# Patient Record
Sex: MALE | URBAN - METROPOLITAN AREA
[De-identification: names, ages, dates, MRNs, and addresses within clinical notes are randomized per-mention and may not be internally consistent; named-entity substitution may affect disease eponyms.]

---

## 2023-12-06 ENCOUNTER — APPOINTMENT (OUTPATIENT)
Dept: PRIMARY CARE | Facility: CLINIC | Age: 28
End: 2023-12-06
Payer: COMMERCIAL

## 2023-12-06 PROBLEM — F90.2 ATTENTION DEFICIT HYPERACTIVITY DISORDER (ADHD), COMBINED TYPE: Status: ACTIVE | Noted: 2023-12-06

## 2023-12-06 PROBLEM — L21.0 DANDRUFF: Status: ACTIVE | Noted: 2023-12-06

## 2023-12-06 PROBLEM — M21.40 FLAT FOOT: Status: ACTIVE | Noted: 2023-12-06

## 2023-12-06 PROBLEM — G47.33 OSA ON CPAP: Status: ACTIVE | Noted: 2023-12-06

## 2023-12-26 ENCOUNTER — OFFICE VISIT (OUTPATIENT)
Dept: PRIMARY CARE | Facility: CLINIC | Age: 28
End: 2023-12-26
Payer: COMMERCIAL

## 2023-12-26 VITALS
TEMPERATURE: 97 F | SYSTOLIC BLOOD PRESSURE: 117 MMHG | BODY MASS INDEX: 32.14 KG/M2 | WEIGHT: 200 LBS | RESPIRATION RATE: 18 BRPM | DIASTOLIC BLOOD PRESSURE: 76 MMHG | HEIGHT: 66 IN | HEART RATE: 87 BPM | OXYGEN SATURATION: 97 %

## 2023-12-26 DIAGNOSIS — G47.33 OSA ON CPAP: ICD-10-CM

## 2023-12-26 DIAGNOSIS — Z00.00 ROUTINE HEALTH MAINTENANCE: Primary | ICD-10-CM

## 2023-12-26 DIAGNOSIS — M25.562 LEFT KNEE PAIN, UNSPECIFIED CHRONICITY: ICD-10-CM

## 2023-12-26 DIAGNOSIS — R09.81 NASAL CONGESTION: ICD-10-CM

## 2023-12-26 PROBLEM — F41.1 GAD (GENERALIZED ANXIETY DISORDER): Status: ACTIVE | Noted: 2023-12-26

## 2023-12-26 PROBLEM — K21.9 GASTROESOPHAGEAL REFLUX DISEASE WITHOUT ESOPHAGITIS: Status: ACTIVE | Noted: 2022-01-31

## 2023-12-26 PROBLEM — S32.040A COMPRESSION FRACTURE OF FOURTH LUMBAR VERTEBRA (MULTI): Status: ACTIVE | Noted: 2023-05-11

## 2023-12-26 PROBLEM — R06.83 SNORING: Status: ACTIVE | Noted: 2020-03-12

## 2023-12-26 PROBLEM — R00.2 PALPITATION: Status: ACTIVE | Noted: 2020-03-12

## 2023-12-26 PROBLEM — R09.89 CHRONIC THROAT CLEARING: Status: ACTIVE | Noted: 2021-04-02

## 2023-12-26 PROBLEM — G47.19 EXCESSIVE DAYTIME SLEEPINESS: Status: ACTIVE | Noted: 2020-03-12

## 2023-12-26 PROBLEM — R21 RASH: Status: ACTIVE | Noted: 2020-05-11

## 2023-12-26 PROCEDURE — 99213 OFFICE O/P EST LOW 20 MIN: CPT | Performed by: FAMILY MEDICINE

## 2023-12-26 PROCEDURE — 90686 IIV4 VACC NO PRSV 0.5 ML IM: CPT | Performed by: FAMILY MEDICINE

## 2023-12-26 PROCEDURE — 1036F TOBACCO NON-USER: CPT | Performed by: FAMILY MEDICINE

## 2023-12-26 PROCEDURE — 99395 PREV VISIT EST AGE 18-39: CPT | Performed by: FAMILY MEDICINE

## 2023-12-26 PROCEDURE — 90471 IMMUNIZATION ADMIN: CPT | Performed by: FAMILY MEDICINE

## 2023-12-26 ASSESSMENT — ENCOUNTER SYMPTOMS
SORE THROAT: 1
COUGH: 0
FEVER: 0
ABDOMINAL PAIN: 1
ARTHRALGIAS: 1

## 2023-12-26 NOTE — PROGRESS NOTES
"Subjective   Patient ID: Shaun Monterroso is a 28 y.o. male who presents for Annual Exam (/) and Knee Pain.    Knee Pain   The incident occurred more than 1 week ago. The pain is present in the left knee. The quality of the pain is described as aching. The pain is at a severity of 5/10. The pain is moderate. The symptoms are aggravated by movement.     He is here today for annual physical.  Would also like to discuss several other issues  Knee pain: He has had pain in his left knee for approximately 1.5 months.  There was no injury prior to onset, however he does play volleyball and is not sure if this may have contributed to his knee pain.  No history of any knee problems in the past.  Pain is located anterior aspect of knee and is most noticeable when he is squatting.  Pain can be up to a 4-5 out of 10 intensity pain at its worst.  Nothing to treat.  Knee does not feel unstable  He has a history of mild obstructive sleep apnea and follows with a sleep specialist.  He has had difficulty tolerating CPAP in the past.  He most recently saw sleep medicine in February and they had recommended that he see ENT.  He has had left nostril congestion for the past 6 to 7 months.  Using Flonase  He does feel tired and sleepy during the day.  Admits to gasping at night  He does not currently smoke but he does vape  Last Tdap vaccine was 2020  Developed a scratchy throat this morning.  No fever.      Review of Systems   Constitutional:  Negative for fever.   HENT:  Positive for congestion and sore throat. Negative for ear pain.    Respiratory:  Negative for cough.    Cardiovascular:  Negative for chest pain.   Gastrointestinal:  Positive for abdominal pain.   Musculoskeletal:  Positive for arthralgias.   All other systems reviewed and are negative.      Objective   /76   Pulse 87   Temp 36.1 °C (97 °F)   Resp 18   Ht 1.676 m (5' 6\")   Wt 90.7 kg (200 lb)   SpO2 97%   BMI 32.28 kg/m²     Physical Exam  Vitals reviewed. "   Constitutional:       General: He is not in acute distress.     Appearance: Normal appearance. He is well-developed.   HENT:      Head: Normocephalic.      Right Ear: Tympanic membrane, ear canal and external ear normal.      Left Ear: Tympanic membrane, ear canal and external ear normal.      Nose: Congestion present.      Mouth/Throat:      Mouth: Mucous membranes are moist.   Eyes:      Conjunctiva/sclera: Conjunctivae normal.   Neck:      Thyroid: No thyromegaly.      Vascular: No JVD.   Cardiovascular:      Rate and Rhythm: Normal rate and regular rhythm.      Heart sounds: Normal heart sounds.   Pulmonary:      Effort: Pulmonary effort is normal.      Breath sounds: Normal breath sounds.   Abdominal:      Palpations: Abdomen is soft.      Tenderness: There is no abdominal tenderness.   Musculoskeletal:         General: Normal range of motion.      Comments: Left knee exam: There is no tenderness to palpation.  Knee strength is plus 5 out of 5.  No swelling, erythema or effusion.  No significant pain with Nely's   Lymphadenopathy:      Cervical: No cervical adenopathy.   Skin:     Findings: No rash.   Neurological:      Mental Status: He is alert and oriented to person, place, and time.   Psychiatric:         Mood and Affect: Mood normal.         Behavior: Behavior normal.         Assessment/Plan   Problem List Items Addressed This Visit             ICD-10-CM       Medium    DELFINA on CPAP G47.33     Other Visit Diagnoses         Codes    Routine health maintenance    -  Primary Z00.00    Relevant Orders    CBC    Comprehensive Metabolic Panel    Lipid Panel    TSH with reflex to Free T4 if abnormal    Ferritin    Nasal congestion     R09.81    Relevant Orders    Referral to ENT    Left knee pain, unspecified chronicity     M25.562        #1 preventative health  Healthy diet and regular exercise is recommended  Check screening labs including lipid panel and glucose.  His sleep medicine physician had ordered  a ferritin level for RLS symptoms so we will add this to his labs as well  Follow-up in 1 year for next CPE  Given flu vaccine today.  Up-to-date on tetanus vaccination  2.  Left knee pain: He has had a 1.5-month history of intermittent left knee pain.  Knee exam today is unremarkable.  Recommend rest and avoiding any aggravating activities.  If this has not resolved in the next 3 to 4 weeks, recommended that he contact us and we will discuss x-ray and PT referral at that time  3.  Chronic left nasal congestion.  Continue Flonase and we will refer him to establish with ENT  Continue to follow with sleep medicine for mild DELFINA

## 2024-01-11 ENCOUNTER — LAB (OUTPATIENT)
Dept: LAB | Facility: LAB | Age: 29
End: 2024-01-11
Payer: COMMERCIAL

## 2024-01-11 DIAGNOSIS — Z00.00 ROUTINE HEALTH MAINTENANCE: ICD-10-CM

## 2024-01-11 LAB
ALBUMIN SERPL BCP-MCNC: 4.8 G/DL (ref 3.4–5)
ALP SERPL-CCNC: 83 U/L (ref 33–120)
ALT SERPL W P-5'-P-CCNC: 54 U/L (ref 10–52)
ANION GAP SERPL CALC-SCNC: 13 MMOL/L (ref 10–20)
AST SERPL W P-5'-P-CCNC: 35 U/L (ref 9–39)
BILIRUB SERPL-MCNC: 0.7 MG/DL (ref 0–1.2)
BUN SERPL-MCNC: 12 MG/DL (ref 6–23)
CALCIUM SERPL-MCNC: 9.8 MG/DL (ref 8.6–10.3)
CHLORIDE SERPL-SCNC: 105 MMOL/L (ref 98–107)
CHOLEST SERPL-MCNC: 206 MG/DL (ref 0–199)
CHOLESTEROL/HDL RATIO: 3.9
CO2 SERPL-SCNC: 28 MMOL/L (ref 21–32)
CREAT SERPL-MCNC: 0.98 MG/DL (ref 0.5–1.3)
EGFRCR SERPLBLD CKD-EPI 2021: >90 ML/MIN/1.73M*2
ERYTHROCYTE [DISTWIDTH] IN BLOOD BY AUTOMATED COUNT: 12.3 % (ref 11.5–14.5)
FERRITIN SERPL-MCNC: 301 NG/ML (ref 20–300)
GLUCOSE SERPL-MCNC: 86 MG/DL (ref 74–99)
HCT VFR BLD AUTO: 50.7 % (ref 41–52)
HDLC SERPL-MCNC: 53 MG/DL
HGB BLD-MCNC: 16.9 G/DL (ref 13.5–17.5)
LDLC SERPL CALC-MCNC: 138 MG/DL
MCH RBC QN AUTO: 28.8 PG (ref 26–34)
MCHC RBC AUTO-ENTMCNC: 33.3 G/DL (ref 32–36)
MCV RBC AUTO: 87 FL (ref 80–100)
NON HDL CHOLESTEROL: 153 MG/DL (ref 0–149)
NRBC BLD-RTO: 0 /100 WBCS (ref 0–0)
PLATELET # BLD AUTO: 262 X10*3/UL (ref 150–450)
POTASSIUM SERPL-SCNC: 4.5 MMOL/L (ref 3.5–5.3)
PROT SERPL-MCNC: 7.4 G/DL (ref 6.4–8.2)
RBC # BLD AUTO: 5.86 X10*6/UL (ref 4.5–5.9)
SODIUM SERPL-SCNC: 141 MMOL/L (ref 136–145)
TRIGL SERPL-MCNC: 77 MG/DL (ref 0–149)
TSH SERPL-ACNC: 1.25 MIU/L (ref 0.44–3.98)
VLDL: 15 MG/DL (ref 0–40)
WBC # BLD AUTO: 5.9 X10*3/UL (ref 4.4–11.3)

## 2024-01-11 PROCEDURE — 85027 COMPLETE CBC AUTOMATED: CPT

## 2024-01-11 PROCEDURE — 36415 COLL VENOUS BLD VENIPUNCTURE: CPT

## 2024-01-11 PROCEDURE — 80061 LIPID PANEL: CPT

## 2024-01-11 PROCEDURE — 80053 COMPREHEN METABOLIC PANEL: CPT

## 2024-01-11 PROCEDURE — 82728 ASSAY OF FERRITIN: CPT

## 2024-01-11 PROCEDURE — 84443 ASSAY THYROID STIM HORMONE: CPT

## 2024-01-12 ENCOUNTER — TELEPHONE (OUTPATIENT)
Dept: PRIMARY CARE | Facility: CLINIC | Age: 29
End: 2024-01-12
Payer: COMMERCIAL

## 2024-01-12 DIAGNOSIS — R74.01 ELEVATED ALT MEASUREMENT: ICD-10-CM

## 2024-01-12 DIAGNOSIS — R79.89 ELEVATED FERRITIN: Primary | ICD-10-CM

## 2024-01-12 NOTE — TELEPHONE ENCOUNTER
I discussed lab results with him over the phone  Lipid panel is suboptimal with total cholesterol 206 and .  Briefly discussed healthy diet and regular exercise and we will check this again in 1 year  His ferritin is slightly elevated at 301, and ALT slightly elevated 54.  No excess alcohol use and no history of any liver problems in the past.  Since these are only slightly elevated, I would like to recheck these tests along with a full iron panel in 2 to 3 months to confirm that they return to normal range

## 2024-02-13 ENCOUNTER — OFFICE VISIT (OUTPATIENT)
Dept: OTOLARYNGOLOGY | Facility: CLINIC | Age: 29
End: 2024-02-13
Payer: COMMERCIAL

## 2024-02-13 VITALS
BODY MASS INDEX: 33.17 KG/M2 | HEIGHT: 66 IN | WEIGHT: 206.4 LBS | TEMPERATURE: 97.8 F | DIASTOLIC BLOOD PRESSURE: 75 MMHG | SYSTOLIC BLOOD PRESSURE: 117 MMHG

## 2024-02-13 DIAGNOSIS — J30.9 CHRONIC ALLERGIC RHINITIS: Primary | ICD-10-CM

## 2024-02-13 DIAGNOSIS — Z86.69 HISTORY OF SLEEP APNEA: ICD-10-CM

## 2024-02-13 DIAGNOSIS — J34.3 HYPERTROPHY OF BOTH INFERIOR NASAL TURBINATES: ICD-10-CM

## 2024-02-13 PROCEDURE — 31575 DIAGNOSTIC LARYNGOSCOPY: CPT | Performed by: OTOLARYNGOLOGY

## 2024-02-13 PROCEDURE — 1036F TOBACCO NON-USER: CPT | Performed by: OTOLARYNGOLOGY

## 2024-02-13 PROCEDURE — 99203 OFFICE O/P NEW LOW 30 MIN: CPT | Performed by: OTOLARYNGOLOGY

## 2024-02-13 RX ORDER — METHYLPHENIDATE HYDROCHLORIDE 18 MG/1
18 TABLET ORAL EVERY MORNING
COMMUNITY
End: 2024-06-10 | Stop reason: ALTCHOICE

## 2024-02-13 ASSESSMENT — PATIENT HEALTH QUESTIONNAIRE - PHQ9
2. FEELING DOWN, DEPRESSED OR HOPELESS: NOT AT ALL
SUM OF ALL RESPONSES TO PHQ9 QUESTIONS 1 AND 2: 0
1. LITTLE INTEREST OR PLEASURE IN DOING THINGS: NOT AT ALL

## 2024-02-13 NOTE — PROGRESS NOTES
Impression:  1. Chronic allergic rhinitis        2. Hypertrophy of both inferior nasal turbinates  Referral to ENT      3. History of sleep apnea              RECOMMENDATIONS/PLAN :  I reassured the patient that his septum is relatively straight and I do not recommend any surgical intervention at the present time.  I do want him to rinse his nose using saline rinses and use his Flonase nasal spray-2 puffs each nostril on a daily basis.  He will touch base with his sleep doctor regarding trying a new CPAP mask.      **This electronic medical record note was created with the use of voice recognition software.  Despite proofreading, typographical or grammatical errors may be present that could affect meaning of content **    Subjective   Patient ID:     Shaun Monterroso is a 28 y.o. male who presents to the office today complaining of nasal congestion that seems to be a little worse in the left nostril.  He has not been using Flonase on a regular basis.  Currently he is not rinsing his nose using saline rinses.  He denies repetitive sinus infections.  Apparently he has had a previous sleep study that revealed mild obstructive sleep apnea and he has tried various masks without much success.    ROS:  A detailed 12 system review of systems is noted on the intake form has been reviewed with the patient with details noted in the HPI and scanned into the patient's medical record.    Objective     History reviewed. No pertinent past medical history.     Past Surgical History:   Procedure Laterality Date    OTHER SURGICAL HISTORY  10/10/2022    No history of surgery        No Known Allergies       Current Outpatient Medications:     methylphenidate ER (Concerta) 18 mg daily tablet, Take 1 tablet (18 mg) by mouth once daily in the morning. Do not crush, chew, or split., Disp: , Rfl:      Tobacco Use: Low Risk  (2/13/2024)    Patient History     Smoking Tobacco Use: Never     Smokeless Tobacco Use: Never     Passive Exposure: Not  "on file        Alcohol Use: Not on file        Social History     Substance and Sexual Activity   Drug Use Never        Physical Exam:  Visit Vitals  /75   Temp 36.6 °C (97.8 °F) (Temporal)   Ht 1.676 m (5' 6\")   Wt 93.6 kg (206 lb 6.4 oz)   BMI 33.31 kg/m²   Smoking Status Never   BSA 2.09 m²      General: Patient is alert, oriented, cooperative in no apparent distress.  Head: Normocephalic, atraumatic.  Eyes: PERRL, EOMI, Conjunctiva is clear. No nystagmus.  Ears: Right Ear-- Pinna is normal.  External auditory canal is patent. Tympanic membrane is [intact, translucent and has good mobility with my pneumatic otoscope. No effusion].  Mastoid is nontender.  Left ear-- Pinna is normal.  External auditory canal is patent. Tympanic membrane is [intact, translucent and has good mobility with my pneumatic otoscope.  No effusion].  Mastoid is nontender.  Nose: Septum is relatively straight with a mild spur to the right.  No septal perforation or lesions. No septal hematoma/ seroma.  No signs of bleeding.  Inferior turbinates are moderately swollen.   No evidence of intranasal polyps.  No infectious drainage.  Throat:  Floor of mouth is clear, no masses.  Tongue appears normal, no lesions or masses. Gums, gingiva, buccal mucosa appear pink and moist, no lesions. Teeth are in good repair.  No obvious dental infections.  Peritonsillar regions appear symmetric without swelling.  Hard and soft palate appear normal, no obvious cleft. Uvula is midline.  Oropharynx: No lesions. Retropharyngeal wall is flat.  No active postnasal drip.  Neck: Supple,  no lymphadenopathy.  No masses.  Salivary Glands: Symmetric bilaterally.  No palpable masses.  No evidence of acute infection or salivary stones  Neurologic: Cranial Nerves 2-12 are grossly intact without focal deficits. Cerebellar function testing is normal.     Results:   []    Procedure:   Pre Op Diagnosis: Chronic nasal congestion-rule out intranasal polyps  Post Op " Diagnosis: Same  Procedure: Flexible Nasopharyngolaryngoscopy  Surgeon: Juan Holt DO  Assist: None  Anesthesia: Topical Lidocaine 4%/ 0.05% Afrin 1:1 mixture  EBL: None  Complications: None  Disposition: The patient tolerated the procedure well. There were no complications.    Procedure:  After informed consent was obtained with the risks, benefits, complications and alternatives explained to the patient/ guardian, the patient was sat up in the ENT chair and the nose was anesthetized and decongested with topical  4% lidocaine and 0.05% Afrin. After allowing this to take effect, the flexible nasopharyngoscope was advanced thru the nostrils and down to the larynx. The following areas were visualized:  The nasal passages, septum, nasopharynx, sinus ostia, base of tongue, epiglottis, true and false vocal folds, arytenoids, post cricoid region and subglottis were all examined and found to be normal except as follows:  Septum is relatively straight with a mild spur to the right.  Ostiomeatal complexes are clear bilaterally.  No pus polyps or lesions.  Nasopharynx is clear.  No masses.  Base of tongue clear.  Epiglottis is normal.  True and false vocal cords are approximating equally bilaterally.  No nodules polyps or lesions.  Subglottis is clear.      The patient tolerated the procedure well and there were no complications.      Juan Holt DO

## 2024-06-10 ENCOUNTER — OFFICE VISIT (OUTPATIENT)
Dept: SLEEP MEDICINE | Facility: CLINIC | Age: 29
End: 2024-06-10
Payer: COMMERCIAL

## 2024-06-10 VITALS
BODY MASS INDEX: 32.67 KG/M2 | SYSTOLIC BLOOD PRESSURE: 123 MMHG | DIASTOLIC BLOOD PRESSURE: 81 MMHG | HEART RATE: 86 BPM | WEIGHT: 203.3 LBS | OXYGEN SATURATION: 98 % | HEIGHT: 66 IN

## 2024-06-10 DIAGNOSIS — G47.33 OSA (OBSTRUCTIVE SLEEP APNEA): Primary | ICD-10-CM

## 2024-06-10 DIAGNOSIS — G47.19 EXCESSIVE DAYTIME SLEEPINESS: ICD-10-CM

## 2024-06-10 DIAGNOSIS — G47.33 OSA ON CPAP: ICD-10-CM

## 2024-06-10 PROCEDURE — 99214 OFFICE O/P EST MOD 30 MIN: CPT | Performed by: INTERNAL MEDICINE

## 2024-06-10 RX ORDER — FLUTICASONE PROPIONATE 50 MCG
1 SPRAY, SUSPENSION (ML) NASAL DAILY
COMMUNITY
Start: 2024-02-27

## 2024-06-10 RX ORDER — GABAPENTIN 300 MG/1
CAPSULE ORAL
Qty: 60 CAPSULE | Refills: 2 | Status: SHIPPED | OUTPATIENT
Start: 2024-06-10

## 2024-06-10 RX ORDER — CETIRIZINE HYDROCHLORIDE 10 MG/1
10 TABLET ORAL DAILY
COMMUNITY
Start: 2024-02-27

## 2024-06-10 ASSESSMENT — PATIENT HEALTH QUESTIONNAIRE - PHQ9
1. LITTLE INTEREST OR PLEASURE IN DOING THINGS: NOT AT ALL
2. FEELING DOWN, DEPRESSED OR HOPELESS: NOT AT ALL
SUM OF ALL RESPONSES TO PHQ9 QUESTIONS 1 AND 2: 0

## 2024-06-10 ASSESSMENT — PAIN SCALES - GENERAL: PAINLEVEL: 0-NO PAIN

## 2024-06-10 ASSESSMENT — COLUMBIA-SUICIDE SEVERITY RATING SCALE - C-SSRS
6. HAVE YOU EVER DONE ANYTHING, STARTED TO DO ANYTHING, OR PREPARED TO DO ANYTHING TO END YOUR LIFE?: NO
1. IN THE PAST MONTH, HAVE YOU WISHED YOU WERE DEAD OR WISHED YOU COULD GO TO SLEEP AND NOT WAKE UP?: NO
2. HAVE YOU ACTUALLY HAD ANY THOUGHTS OF KILLING YOURSELF?: NO

## 2024-06-10 NOTE — PROGRESS NOTES
Patient: Shaun Monterroso   Patient info: [id number]  : 1995 -- AGE 28 y.o.    Clinician: Yamilex Bruno MD   Location [facility]   Service Date: 6/10/2024          [facility]  Follow-up Visit Note         HISTORY OF PRESENT ILLNESS     Shaun Monterroso is a 28 y.o. male who presents to a [facility] for followup.        PAST SLEEP HISTORY   Last seen: 2023  Summary of last visit: 27 year male here for the initial sleep evaluation of reportedly Mild DELFINA symptomatic with am headaches, unrefreshing sleep, loud bothersome snoring, and fatigue and sleepiness intermittently. PAP intolerant despite multiple mask trials and wakes up with mask off the face. Had unsuccessful attempt with PAP titration in the sleep lab, using nasal mask. Congestion is problematic and has done better but not fully better with FFM (still takes off mask at night). PAP intolerance may be multifactorial, and may be moving a lot in sleep (kicking behavior) which will effect ability for mask to stay on. Motivation is lower for PAP and seeks non PAP routes of care.      RLS: effects ability to cuddle with wife; but not sleep onset; Unclear time frame possibly few years. needs labs.      DATA:  HSAT ? needs these records---> requested today   2022 PAP titration study- required more air, but then could not tolerate PAP, AHI on PAP was 2.4  PLM 0. Study was ended early due to intolerance  Labs: CBC, liver function tests  done (showed my his mychart labs)--      Management  1. Please check to see if your machine is under the [employer] recall  - [employer] recall ma need to register their device   2. obtain your last records (home testing)  3. Lets try flonase for a good 1-2 weeks and then restart PAP therapy  4. Will also continue flonase until you see ENT: Dr. Mona Montano for non-PAP options  5. See Dr. Justin Velasquez for potential dental  appliance, after Dr. Montano's evaluation  Then we will see you back ? 6months or less.   6. Labs: for restless legs and memory- I will call you with results.   7. 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   8. agree with quitting vaping or at least reducing     RLS labs--> if iron low supplement; will add B12 due to memory concerns   ADHD meds--> PCP is prescribing.      Records request  1. Send for HSAT Results to   2. Send to [facility] with order to obtain PAP settings.      RTC: 6 months or sooner.        INTERIM DATA REVIEW:  Personally reviewed any raw data such as interpretation report, data sheet, hypnogram, and titration table if available and applicable  Notes and encounters in interim    CURRENT HISTORY/CONCERNS  Had mild DELFINA PAP and could not use  So we refer    SCALES:   ESS: 5 /24  DIOMEDES 16 /28  FOSQ 29 /40    On today's visit, the patient reports that he would like to revisit the discussion around sleep apnea.     DELFINA:   Not on PAP anymore  Had the machine that was under recall with [employer], new about the recall (?via [facility]) but never replaced it (and now its too late)    Daytime Symptoms  Pushes through the day, managing   But feels memory and energy levels are still a problem and would like to address it    Tried concerta, (had ADHD as a child) but did not help so now off.   ESS today is normal off meds     Sleep-Wake Schedule:   Night meds prior to sleep: none  Bedtime: 12am-1am awake at 9am  Tries to get up at 7am, but struggles for 2 hours then up by 9am  (Needs to be up but unable? Tries to adjust his schedule but was not able to stick to it)   Naps sometimes through him off so avoids these   Cannot cat nap (if he naps is 4 hours- rare)     Sleep paralysis/hallucinations- only once 2 weeks ago.   Denies cataplexy    REVIEW OF SYSTEMS   Review of Systems    ALLERGIES AND MEDICATIONS   No Known  "Allergies    MEDICATIONS:     Current Outpatient Medications:     cetirizine (ZyrTEC) 10 mg tablet, Take 1 tablet (10 mg) by mouth once daily., Disp: , Rfl:     fluticasone (Flonase) 50 mcg/actuation nasal spray, Administer 1 spray into each nostril once daily., Disp: , Rfl:       HISTORIES   PAST MEDICAL HISTORY : He  has no past medical history on file.  Patient Active Problem List   Diagnosis    Attention deficit hyperactivity disorder (ADHD), combined type    Dandruff    Flat foot    DELFINA on CPAP    Snoring    Rash    Palpitation    Gastroesophageal reflux disease without esophagitis    TRINIDAD (generalized anxiety disorder)    Excessive daytime sleepiness    Compression fracture of fourth lumbar vertebra (Multi)    Chronic throat clearing     PAST SURGICAL HISTORY: He  has a past surgical history that includes Other surgical history (10/10/2022).   FAMILY HISTORY: No changes since previous visit. Otherwise non-contributory as charted.     SOCIAL HISTORY  Patient:  reports that he has never smoked. He has never used smokeless tobacco. He reports that he does not drink alcohol and does not use drugs.     PHYSICAL EXAM     VITAL SIGNS: /81 (BP Location: Right arm, Patient Position: Sitting, BP Cuff Size: Large adult)   Pulse 86   Ht 1.676 m (5' 6\")   Wt 92.2 kg (203 lb 4.8 oz)   SpO2 98%   BMI 32.81 kg/m²   PREVIOUS WEIGHTS:  Wt Readings from Last 3 Encounters:   06/10/24 92.2 kg (203 lb 4.8 oz)   02/13/24 93.6 kg (206 lb 6.4 oz)   12/26/23 90.7 kg (200 lb)     EXAM:  General: Alert, attentive, in NAD  HEENT: Nares patent, oropharynx is Mallampati class 2-3 uvula: midline nonedematous,  large tongue for space   Lip assymetry  Dentition/Jaw: appropriate dentition;  no cross bite, upper teeth just over the lower with an overbite   Neck: no visible masses  Lungs: Clear no cough  Extremities: warm, well perfused, no visible edema, normal joints,   Skin: no visible rash   Neurologic: face symmetric   Psychiatric: " Affect appropriate      OTHER RESULTS/DATA     Lab Review  Last iron studies:   Ferritin (ng/mL)   Date Value   01/11/2024 301 (H)   :    CBC:   Lab Results   Component Value Date    WBC 5.9 01/11/2024    HGB 16.9 01/11/2024    HCT 50.7 01/11/2024    MCV 87 01/11/2024     01/11/2024    TSH:   Lab Results   Component Value Date    TSH 1.25 01/11/2024   ; BMP:   Lab Results   Component Value Date    GLUCOSE 86 01/11/2024    CALCIUM 9.8 01/11/2024     01/11/2024    K 4.5 01/11/2024    CO2 28 01/11/2024     01/11/2024    BUN 12 01/11/2024    CREATININE 0.98 01/11/2024         ASSESSMENT/PLAN   Mr. Monterroso is a 28 y.o. male  returning to the  Sleep Medicine Clinic for follow-up of DELFINA and sleep maintenance problems with next day concerns about mood/focus etc. Did not respond to trial with ADHD medication/stimulant with PCP. So wants to revisit sleep. Unclear what role DELFINA is playing in his presentation. There is a pain component of sleep and this may be influencing some of the sleep quality challenges he is experiencing. A trial of medication may be helpful to see if this consolidates sleep and helps with pain, and potentially improve daytime dysfunction. If not, will repeat testing and therapy for DELFINA.  We also talked about mood and he may benefit from therapy.     Problem List and Plan/Orders  Problem List Items Addressed This Visit       DELFINA on CPAP    Overview     DATA:  HSAT 2020? Requested those records; 11/2022 PAP titration study- required more air, but then could not tolerate PAP, AHI on PAP was 2.4 PLM 0. Study was ended early due to intolerance  Had [employer] recalled machine via [facility], new that but did not replace it.            Excessive daytime sleepiness     Other Visit Diagnoses       DELFINA (obstructive sleep apnea)    -  Primary    Relevant Medications    gabapentin (Neurontin) 300 mg capsule    Other Relevant Orders    In-Center Sleep Study (Pediatric or [facility])            CGI:  4-  no change      Recommendations/Plan/Management:  Trial with gabapentin, titration schedule given.  We will trial with gabapentin 300 mg before bedtime to help with pains which may also help sleep continuity  (10 pm-11 pm no later, and see how you do in am with this, move earlier to 9:30 pm for example if too groggy in am with this).  May increase after 1-2 weeks to 600mg and then we will see how you are doing.   DELFINA:  Repeat split night study,  trial with different pressures  investigate oral appliance therapy; mandibular advancement device with a dentist.   Consider [facility] for therapy, may take your insurance and have a location near you  Adequate sleep duration and appropriate timing       Diagnostics:  split night study  Followup: after testing, in a few months, provided contacts for interim care if needed.    Yamilex Bruno MD

## 2024-06-10 NOTE — PATIENT INSTRUCTIONS
"     NAME: Shaun Monterroso   DATE:  6/10/2024     Your Sleep Physician Today: Yamilex Bruno MD  Your Primary Care Physician: Jimmie Viera, DO      Thank you for coming to the Sleep Medicine Clinic today!  Below is a summary of your treatment plan, other important information, and our contact numbers:    TREATMENT PLAN     Will repeat your sleep study and do a split night again to trial with different pressures  Please investigate dental appliance for apnea (oral appliance therapy; mandibular advancement device) with a dentist. See below for some options or talk with your dentist.   See you after your study   We will trial with gabapentin 300 mg before bedtime to help with pains which may also help sleep continuity  (10 pm-11 pm no later, and see how you do in am with this, move earlier to 9:30 pm for example if too groggy in am with this).  May increase after 1-2 weeks to 600mg and then we will see how you are doing.   Consider [facility] - network for therapy, may take your insurance and have a location near you       Follow-up Appointment:   3-4 months     OUR ADULT SLEEP MEDICINE TEAM- Getting in touch   Please do not hesitate to call the office or sleep nurse with any questions between appointments. BEST way to do this are via ways below:    Me and my office team, quick contact:  Call [phone] and leave a message. One of the administrative assistants will forward the message to my/sleep nurse through \"My Chart\" and/or email.   Have a clinical question?  Adult sleep nurse: Jyoti Mcfadden -[phone].    Call for clinical questions, medication updates, and concerns about prescriptions.   Email seep diaries and other documents at: [email or web address]  Or, send a message directly through \"My Chart\", which is the email service through your  Records Account: [email or web address]:// [email or web address].  This does not go directly to me but through another before it gets to me. So only use this if you " "have time to wait for a response.  If your issue is urgent, please call.   Have an appointment question?  Have an appointment concern, form faxed or other office issues?  Adult sleep : Rosetta Sorto P: [phone]  F: [phone];         IMPORTANT PHONE NUMBERS     Appointments (for Adult Sleep Clinic- general line): [phone] - option 2  Appointments (For Sleep Studies- general line): [phone] - option 3  Sleep Surgery: [phone]  ENT (Otolaryngology): [phone]  Omnidrone ([employer]): [phone]    CONTACTING YOUR SLEEP MEDICINE DOCTOR- Getting in touch     Send a message directly to your provider through \"My Chart\", which is the email service through your  Records Account: [email or web address]   Call [phone] and leave a message. One of the administrative assistants will forward the message to your sleep medicine provider through \"My Chart\" and/or email.   Sleep nurse: For clinical questions, medication updates and refilling prescriptions: [phone]; or by email: [email or web address]    Please do not hesitate to reach out if you have pending questions.    Yamilex Bruno MD       Oral Appliance Therapy    Oral appliance therapy is a dental device that can be fabricated by a dental sleep medicine specialist.     After talking about sleep apnea treatment options, we have decided that an oral appliance is a reasonable treatment option. You need to contact a dentist who is comfortable with making oral appliances. You should consider a dental sleep specialist. Below are the names of individuals that we commonly refer patients to or are known dental sleep medicine specialists:    Please schedule an appointment with dentistry to evaluate you for an oral appliance to treat your sleep apnea. Consider the following specialists in the area:     Dr. Pako Florez  /[facility]: [phone]    [facility]:  Dr. Anderson " Ron Lemus  [facility]   [phone]    Dr. Ab Davis  [place], [place]: [phone]    Dr. Bigg Tavera  [place]: [phone]    Dr. Christian Pack, [place]: [phone]    Dr. Sarah Cerda, [phone]    Dr. Junior Suárez, [place]: [phone]    Dr. Rad Rogers, [place]: [phone]    Dr. Taz Stern, [place]: [phone]    Dr. Ashwin Pineda, [place]: [phone]    You can also check the American Academy of Dental Sleep Medicine for additional recommendation of dentists that make appliances at: https://mms.aadsm.org/members/directory/search_bootstrap.php?org_id=ADSM.      Oral appliance therapy is typically covered by your medical insurance as sleep apnea is a medical diagnoses and not a dental diagnosis. You can confirm coverage by calling your medical insurance and providing them with the following medical codes:  Diagnosis code: Obstructive Sleep Apnea G47.33  Procedure code: T92149    After your appliance is made and adjusted, we like to make sure it is treating your sleep apnea effectively. Please return to our clinic at that time for follow up.

## 2024-09-06 ENCOUNTER — CLINICAL SUPPORT (OUTPATIENT)
Dept: SLEEP MEDICINE | Facility: CLINIC | Age: 29
End: 2024-09-06
Payer: COMMERCIAL

## 2024-09-06 DIAGNOSIS — G47.33 OSA (OBSTRUCTIVE SLEEP APNEA): ICD-10-CM

## 2024-09-07 VITALS
DIASTOLIC BLOOD PRESSURE: 80 MMHG | HEIGHT: 66 IN | BODY MASS INDEX: 32.67 KG/M2 | WEIGHT: 203.26 LBS | SYSTOLIC BLOOD PRESSURE: 119 MMHG

## 2024-09-07 ASSESSMENT — SLEEP AND FATIGUE QUESTIONNAIRES
HOW LIKELY ARE YOU TO NOD OFF OR FALL ASLEEP WHILE SITTING AND READING: MODERATE CHANCE OF DOZING
HOW LIKELY ARE YOU TO NOD OFF OR FALL ASLEEP WHEN YOU ARE A PASSENGER IN A CAR FOR AN HOUR WITHOUT A BREAK: WOULD NEVER DOZE
HOW LIKELY ARE YOU TO NOD OFF OR FALL ASLEEP WHILE SITTING AND TALKING TO SOMEONE: WOULD NEVER DOZE
HOW LIKELY ARE YOU TO NOD OFF OR FALL ASLEEP WHILE LYING DOWN TO REST IN THE AFTERNOON WHEN CIRCUMSTANCES PERMIT: MODERATE CHANCE OF DOZING
HOW LIKELY ARE YOU TO NOD OFF OR FALL ASLEEP IN A CAR, WHILE STOPPED FOR A FEW MINUTES IN TRAFFIC: WOULD NEVER DOZE
SITING INACTIVE IN A PUBLIC PLACE LIKE A CLASS ROOM OR A MOVIE THEATER: WOULD NEVER DOZE
HOW LIKELY ARE YOU TO NOD OFF OR FALL ASLEEP WHILE SITTING QUIETLY AFTER LUNCH WITHOUT ALCOHOL: WOULD NEVER DOZE
HOW LIKELY ARE YOU TO NOD OFF OR FALL ASLEEP WHILE WATCHING TV: HIGH CHANCE OF DOZING
ESS-CHAD TOTAL SCORE: 7

## 2024-09-07 NOTE — PROGRESS NOTES
[facility] TECH NOTE:     Patient: Shaun Monterroso   MRN//AGE: [id number]  1995  29 y.o.   Technologist: Neli Gonzales   Room: 2   Service Date: 2024        Sleep Testing Location: [facility]:     TECHNOLOGIST SLEEP STUDY PROCEDURE NOTE:   This sleep study is being conducted according to the policies and procedures outlined by the AAS accreditation standards.  The sleep study procedure and processes involved during this appointment was explained to the patient/patient’s family, questions were answered. The patient/family verbalized understanding.      The patient is a 29 y.o. year old male scheduled for a diagnostic SPLIT PSG  with montage of:  SPLIT . he arrived for his appointment.      The study that was ultimately completed was a PSG  with montage of:  SPLIT .    The full study Was completed.  Patient questionnaires completed?: yes     Consents signed? yes    Initial Fall Risk Screening:     Shaun has not fallen in the last 6 months. his did not result in injury. Shaun does not have a fear of falling. He does not need assistance with sitting, standing, or walking. he does not need assistance walking in his home. he does not need assistance in an unfamiliar setting. The patient is notusing an assistive device.     Brief Study observations: The patient is a 29 year old male here for a diagnostic SPLIT PSG. Doctor's orders state he qualifies with AHI over 5. Shaun arrived at 1950 and completed paperwork. He has had a previous HST during [facility] and was on an AutoPAP. He also had an in-lab titration study that was ended early due to the patient having difficulty tolerating higher CPAP pressures. Patient has tried at least 3 different mask styles- likes the under the nose and full face the best.     Deviation to order/protocol and reason: none        Other:None    After the procedure, the patient/family was informed to ensure followup with ordering clinician for testing results.      Technologist: Neli  Janet

## 2024-09-13 ENCOUNTER — PATIENT MESSAGE (OUTPATIENT)
Dept: SLEEP MEDICINE | Facility: CLINIC | Age: 29
End: 2024-09-13
Payer: COMMERCIAL

## 2024-09-13 DIAGNOSIS — R53.83 OTHER FATIGUE: Primary | ICD-10-CM

## 2024-09-13 DIAGNOSIS — G47.33 OSA (OBSTRUCTIVE SLEEP APNEA): ICD-10-CM

## 2024-09-23 ENCOUNTER — OFFICE VISIT (OUTPATIENT)
Dept: SLEEP MEDICINE | Facility: CLINIC | Age: 29
End: 2024-09-23
Payer: COMMERCIAL

## 2024-09-23 VITALS
DIASTOLIC BLOOD PRESSURE: 81 MMHG | OXYGEN SATURATION: 97 % | WEIGHT: 201.9 LBS | BODY MASS INDEX: 32.6 KG/M2 | HEART RATE: 65 BPM | SYSTOLIC BLOOD PRESSURE: 119 MMHG

## 2024-09-23 DIAGNOSIS — G47.33 OSA ON CPAP: Primary | ICD-10-CM

## 2024-09-23 DIAGNOSIS — G47.33 OSA (OBSTRUCTIVE SLEEP APNEA): ICD-10-CM

## 2024-09-23 PROCEDURE — 99214 OFFICE O/P EST MOD 30 MIN: CPT | Performed by: INTERNAL MEDICINE

## 2024-09-23 PROCEDURE — G2211 COMPLEX E/M VISIT ADD ON: HCPCS | Performed by: INTERNAL MEDICINE

## 2024-09-23 NOTE — PATIENT INSTRUCTIONS
"     NAME: Shaun Monterroso   DATE:  9/23/2024     Your Sleep Physician Today: Yamilex Bruno MD  Your Primary Care Physician: Jimmie Viera, DO      Thank you for coming to the Sleep Medicine Clinic today!  Below is a summary of your treatment plan, other important information, and our contact numbers:    TREATMENT PLAN     See the sleep surgeon  Reach out to the home care company with your new insurance information (DME: Medical service company- number is below)  Can use Resmed machine with the mask from the sleep study as ordered.     Follow-up Appointment:  3-4 months      OUR ADULT SLEEP MEDICINE TEAM- Getting in touch   Please do not hesitate to call the office or sleep nurse with any questions between appointments. BEST way to do this are via ways below:    Me and my office team, quick contact:  Call [phone] and leave a message. One of the administrative assistants will forward the message to my/sleep nurse through \"My Chart\" and/or email.   Have a clinical question?  Adult sleep nurse: Jyoti Mcfadden -[phone].    Call for clinical questions, medication updates, and concerns about prescriptions.   Email seep diaries and other documents at: [email or web address]  Or, send a message directly through \"My Chart\", which is the email service through your  Records Account: [email or web address]:// [email or web address].  This does not go directly to me but through another before it gets to me. So only use this if you have time to wait for a response.  If your issue is urgent, please call.   Have an appointment question?  Have an appointment concern, form faxed or other office issues?  Adult sleep : Rosetta Sorto P: [phone]  F: [phone];         IMPORTANT PHONE NUMBERS     Appointments (for Adult Sleep Clinic- general line): [phone] - option 2  Appointments (For Sleep Studies- general line): [phone] - option 3  Sleep Surgery: [phone]  ENT " "(Otolaryngology): [phone]  [employer]): [phone]    CONTACTING YOUR SLEEP MEDICINE DOCTOR- Getting in touch     Send a message directly to your provider through \"My Chart\", which is the email service through your  Records Account: [email or web address]   Call [phone] and leave a message. One of the administrative assistants will forward the message to your sleep medicine provider through \"My Chart\" and/or email.   Sleep nurse: For clinical questions, medication updates and refilling prescriptions: [phone]; or by email: [email or web address]    Please do not hesitate to reach out if you have pending questions.    Yamilex Bruno MD   "

## 2024-09-23 NOTE — PROGRESS NOTES
Patient: Shaun Monterroso   Patient info: [id number]  : 1995 -- AGE 29 y.o.    Clinician: Yamilex Bruno MD   Location [facility]   Service Date: 2024          [facility]  Follow-up Visit Note    HISTORY OF PRESENT ILLNESS     Shaun Monterroso is a 29 y.o. male who presents to a [facility] for followup of DELFINA.     PAST SLEEP HISTORY   Last seen: 6/10/2024  Summary of last visit:   SSESSMENT/PLAN   Mr. Monterroso is a 28 y.o. male  returning to the  Sleep Medicine Clinic for follow-up of DELFINA and sleep maintenance problems with next day concerns about mood/focus etc. Did not respond to trial with ADHD medication/stimulant with PCP. So wants to revisit sleep. Unclear what role DELFINA is playing in his presentation. There is a pain component of sleep and this may be influencing some of the sleep quality challenges he is experiencing. A trial of medication may be helpful to see if this consolidates sleep and helps with pain, and potentially improve daytime dysfunction. If not, will repeat testing and therapy for DELFINA.  We also talked about mood and he may benefit from therapy.      Problem List and Plan/Orders  Problem List Items Addressed This Visit         DELFINA on CPAP     Overview       DATA:  HSAT ? Requested those records; 2022 PAP titration study- required more air, but then could not tolerate PAP, AHI on PAP was 2.4 PLM 0. Study was ended early due to intolerance  Had Respironics recalled machine via [facility], new that but did not replace it.               Excessive daytime sleepiness      Other Visit Diagnoses         DELFINA (obstructive sleep apnea)    -  Primary     Relevant Medications     gabapentin (Neurontin) 300 mg capsule     Other Relevant Orders     In-Center Sleep Study ([facility] or [facility])           CGI:  4- no change       Recommendations/Plan/Management:  Trial with gabapentin, titration schedule  given.  We will trial with gabapentin 300 mg before bedtime to help with pains which may also help sleep continuity  (10 pm-11 pm no later, and see how you do in am with this, move earlier to 9:30 pm for example if too groggy in am with this).  May increase after 1-2 weeks to 600mg and then we will see how you are doing.   DELFINA:  Repeat split night study,  trial with different pressures  investigate oral appliance therapy; mandibular advancement device with a dentist.   Consider [facility] - network for therapy, may take your insurance and have a location near you  Adequate sleep duration and appropriate timing       Diagnostics:  split night study  Followup: after testing, in a few months, provided contacts for interim care if needed.     Yamilex Bruno MD        INTERIM DATA REVIEW:  Personally reviewed any raw data such as interpretation report, data sheet, hypnogram, and titration table if available and applicable  Notes and encounters in interim  Going through transition with insurance   Had sleep study    CURRENT HISTORY/CONCERNS    On today's visit, the patient reports loving the mask on the night of the sleep study  They had a pre-trial before the sleep study, but did not use it that night, did not get a split night (did not qualify earlier enough)  We messaged in interim to talk about next steps   Went to his dentist, does not do OAT and recommended another dentist in [place] who does this  Would like to investigate all options for DELFINA care    Would like to know if DELFINA treatment will help or not  He is willing to do PAP in order to find out.   He felt he could use it again, after the pre-trial on the night of the study.     Takes ashwaganda for coritsol due to highe anxiety  Has also taken Mg, and feels this helps him get tired around 9pm or so  Took both of these on the night of the study and did better than his last PSG. Feels controlling anxiety helps him.     SCALES:   ESS: 7 /24  DIOMEDES 15 /28  FOSQ low  /40    DELFINA:   Not using PAP yet/now, would like to get new machine      Daytime Symptoms  Patient report some daytime symptoms including: fatigue, does not take naps but will sleep again after waking up and extend sleep. Feels he could fall asleep      Sleep-Wake Schedule:   Night meds prior to sleep:  Ashwaclemda,   Bedtime:/sleep latency/Wake time: MN, 10-11am wakes up for work  Will wake up before this and return to sleep  Weekends: same  Awakenings: may wake up 6amd and return to sleep immediately ; otherwise none.  Total nocturnal sleep duration: 10+? hours;  feels this is too [facility] and would like to get less sleepy.       REVIEW OF SYSTEMS   Review of Systems    ALLERGIES AND MEDICATIONS   No Known Allergies    MEDICATIONS:     Current Outpatient Medications:     cetirizine (ZyrTEC) 10 mg tablet, Take 1 tablet (10 mg) by mouth once daily., Disp: , Rfl:     fluticasone (Flonase) 50 mcg/actuation nasal spray, Administer 1 spray into each nostril once daily., Disp: , Rfl:     gabapentin (Neurontin) 300 mg capsule, Take 1 capsule 30minutes to 1 hour po before bedtime, may increase to 2 capsules after 1-2 weeks, Disp: 60 capsule, Rfl: 2      HISTORIES   PAST MEDICAL HISTORY : He  has no past medical history on file.  Patient Active Problem List   Diagnosis    Attention deficit hyperactivity disorder (ADHD), combined type    Dandruff    Flat foot    DELFINA on CPAP    Snoring    Rash    Palpitation    Gastroesophageal reflux disease without esophagitis    TRINIDAD (generalized anxiety disorder)    Excessive daytime sleepiness    Compression fracture of fourth lumbar vertebra (Multi)    Chronic throat clearing     PAST SURGICAL HISTORY: He  has a past surgical history that includes Other surgical history (10/10/2022).   FAMILY HISTORY: No changes since previous visit. Otherwise non-contributory as charted.     SOCIAL HISTORY  Patient:  reports that he has never smoked. He has never used smokeless tobacco. He reports that he  does not drink alcohol and does not use drugs.     PHYSICAL EXAM     VITAL SIGNS: /81 (BP Location: Left arm)   Pulse 65   Wt 91.6 kg (201 lb 14.4 oz)   SpO2 97%   BMI 32.60 kg/m²   PREVIOUS WEIGHTS:  Wt Readings from Last 3 Encounters:   09/23/24 91.6 kg (201 lb 14.4 oz)   09/07/24 92.2 kg (203 lb 4.2 oz)   06/10/24 92.2 kg (203 lb 4.8 oz)     EXAM:  General: Alert, attentive, in NAD  HEENT: Nares patent, oropharynx is Mallampati class 2-3 uvula: midline nonedematous,     Dentition/Jaw: appropriate dentition;    Neck: no visible masses  Lungs: Clear no cough  Extremities: warm, well perfused, no visible edema, normal joints,   Skin: no visible rash   Neurologic: face symmetric   Psychiatric: Affect appropriate      OTHER RESULTS/DATA     Lab Review  Last iron studies:   Ferritin (ng/mL)   Date Value   01/11/2024 301 (H)   :    CBC:   Lab Results   Component Value Date    WBC 5.9 01/11/2024    HGB 16.9 01/11/2024    HCT 50.7 01/11/2024    MCV 87 01/11/2024     01/11/2024    TSH:   Lab Results   Component Value Date    TSH 1.25 01/11/2024   ; BMP:   Lab Results   Component Value Date    GLUCOSE 86 01/11/2024    CALCIUM 9.8 01/11/2024     01/11/2024    K 4.5 01/11/2024    CO2 28 01/11/2024     01/11/2024    BUN 12 01/11/2024    CREATININE 0.98 01/11/2024        ASSESSMENT/PLAN   Mr. Monterroso is a 29 y.o. male  returning to the  Sleep Medicine Clinic for follow-up of DELFINA and daytime fatigue/sleepiness, now with repeat testing which showed moderate DELFINA. Prior failed PAP Therapy, and intolerant. Would like to pursue PAP again,     Problem List and Plan/Orders  Problem List Items Addressed This Visit       DELFINA on CPAP - Primary    Overview     Symptomatic with daytime sleepiness and fatigue.   DATA:HSAT 2020? Requested those records; 11/2022 PAP titration study- required more air, but then could not tolerate PAP, AHI on PAP was 2.4 PLM 0. Study was ended early due to intolerance  Had  Respironics recalled machine via [facility], new that but did not replace it.   Investigated OAT--> would rather do PAP therapy  Seen by ENT--> Dr. Holt  9/6/2024 repeat PSG-moderate DELFINA  split night  planned, but only diagnostic done; Pre-sleep trial with PAP was successful  DME: SEVER[facility] Robledo, AutoPAP ordered with mask from sleep study            Other Visit Diagnoses       DELFINA (obstructive sleep apnea)        Relevant Orders    Follow Up In Adult Sleep Medicine          CGI:  4- no change      Recommendations/Plan/Management:  Initaite PAP, awaiting insurance info with DME--> encouraged to call with insurance information once he has it.   He will continue using meds at night (using herbs, Mg, aptogens)--> he feels this helps his anxiety  Adequate sleep duration and appropriate timing  will see if total sleep time is reduced.       Referral(s): Has been seen by Dr. Holt;  If lingering issues with PAP therapy, will refer to Sleep surgery referral with Dr. Mona Montano or Dr. Ponce.     Followup: 3-4 months, provided contacts for interim care if needed.    Yamilex Bruno MD

## 2024-10-11 ENCOUNTER — HOSPITAL ENCOUNTER (EMERGENCY)
Facility: HOSPITAL | Age: 29
Discharge: HOME | End: 2024-10-11
Attending: EMERGENCY MEDICINE
Payer: COMMERCIAL

## 2024-10-11 VITALS
OXYGEN SATURATION: 97 % | DIASTOLIC BLOOD PRESSURE: 88 MMHG | HEART RATE: 68 BPM | SYSTOLIC BLOOD PRESSURE: 132 MMHG | TEMPERATURE: 97.3 F | WEIGHT: 198 LBS | RESPIRATION RATE: 16 BRPM | HEIGHT: 66 IN | BODY MASS INDEX: 31.82 KG/M2

## 2024-10-11 DIAGNOSIS — R10.11 RIGHT UPPER QUADRANT ABDOMINAL PAIN: Primary | ICD-10-CM

## 2024-10-11 LAB
ALBUMIN SERPL BCP-MCNC: 4.6 G/DL (ref 3.4–5)
ALP SERPL-CCNC: 70 U/L (ref 33–120)
ALT SERPL W P-5'-P-CCNC: 85 U/L (ref 10–52)
ANION GAP SERPL CALC-SCNC: 10 MMOL/L (ref 10–20)
AST SERPL W P-5'-P-CCNC: 33 U/L (ref 9–39)
BASOPHILS # BLD AUTO: 0.05 X10*3/UL (ref 0–0.1)
BASOPHILS NFR BLD AUTO: 0.6 %
BILIRUB SERPL-MCNC: 0.5 MG/DL (ref 0–1.2)
BUN SERPL-MCNC: 20 MG/DL (ref 6–23)
CALCIUM SERPL-MCNC: 9.3 MG/DL (ref 8.6–10.3)
CHLORIDE SERPL-SCNC: 105 MMOL/L (ref 98–107)
CO2 SERPL-SCNC: 30 MMOL/L (ref 21–32)
CREAT SERPL-MCNC: 0.99 MG/DL (ref 0.5–1.3)
EGFRCR SERPLBLD CKD-EPI 2021: >90 ML/MIN/1.73M*2
EOSINOPHIL # BLD AUTO: 0.34 X10*3/UL (ref 0–0.7)
EOSINOPHIL NFR BLD AUTO: 4.1 %
ERYTHROCYTE [DISTWIDTH] IN BLOOD BY AUTOMATED COUNT: 12.1 % (ref 11.5–14.5)
GLUCOSE SERPL-MCNC: 85 MG/DL (ref 74–99)
HCT VFR BLD AUTO: 48.3 % (ref 41–52)
HGB BLD-MCNC: 16 G/DL (ref 13.5–17.5)
IMM GRANULOCYTES # BLD AUTO: 0.02 X10*3/UL (ref 0–0.7)
IMM GRANULOCYTES NFR BLD AUTO: 0.2 % (ref 0–0.9)
LIPASE SERPL-CCNC: 20 U/L (ref 9–82)
LYMPHOCYTES # BLD AUTO: 3.6 X10*3/UL (ref 1.2–4.8)
LYMPHOCYTES NFR BLD AUTO: 43.7 %
MCH RBC QN AUTO: 28.7 PG (ref 26–34)
MCHC RBC AUTO-ENTMCNC: 33.1 G/DL (ref 32–36)
MCV RBC AUTO: 87 FL (ref 80–100)
MONOCYTES # BLD AUTO: 0.54 X10*3/UL (ref 0.1–1)
MONOCYTES NFR BLD AUTO: 6.6 %
NEUTROPHILS # BLD AUTO: 3.68 X10*3/UL (ref 1.2–7.7)
NEUTROPHILS NFR BLD AUTO: 44.8 %
NRBC BLD-RTO: 0 /100 WBCS (ref 0–0)
PLATELET # BLD AUTO: 237 X10*3/UL (ref 150–450)
POTASSIUM SERPL-SCNC: 4.3 MMOL/L (ref 3.5–5.3)
PROT SERPL-MCNC: 7.1 G/DL (ref 6.4–8.2)
RBC # BLD AUTO: 5.57 X10*6/UL (ref 4.5–5.9)
SODIUM SERPL-SCNC: 141 MMOL/L (ref 136–145)
WBC # BLD AUTO: 8.2 X10*3/UL (ref 4.4–11.3)

## 2024-10-11 PROCEDURE — 83690 ASSAY OF LIPASE: CPT | Performed by: EMERGENCY MEDICINE

## 2024-10-11 PROCEDURE — 99283 EMERGENCY DEPT VISIT LOW MDM: CPT

## 2024-10-11 PROCEDURE — 85025 COMPLETE CBC W/AUTO DIFF WBC: CPT | Performed by: EMERGENCY MEDICINE

## 2024-10-11 PROCEDURE — 99284 EMERGENCY DEPT VISIT MOD MDM: CPT | Performed by: EMERGENCY MEDICINE

## 2024-10-11 PROCEDURE — 80053 COMPREHEN METABOLIC PANEL: CPT | Performed by: EMERGENCY MEDICINE

## 2024-10-11 PROCEDURE — 36415 COLL VENOUS BLD VENIPUNCTURE: CPT | Performed by: EMERGENCY MEDICINE

## 2024-10-11 ASSESSMENT — LIFESTYLE VARIABLES
EVER HAD A DRINK FIRST THING IN THE MORNING TO STEADY YOUR NERVES TO GET RID OF A HANGOVER: NO
TOTAL SCORE: 0
HAVE YOU EVER FELT YOU SHOULD CUT DOWN ON YOUR DRINKING: NO
EVER FELT BAD OR GUILTY ABOUT YOUR DRINKING: NO
HAVE PEOPLE ANNOYED YOU BY CRITICIZING YOUR DRINKING: NO

## 2024-10-11 ASSESSMENT — PAIN SCALES - GENERAL
PAINLEVEL_OUTOF10: 3
PAINLEVEL_OUTOF10: 4

## 2024-10-11 ASSESSMENT — COLUMBIA-SUICIDE SEVERITY RATING SCALE - C-SSRS
2. HAVE YOU ACTUALLY HAD ANY THOUGHTS OF KILLING YOURSELF?: NO
1. IN THE PAST MONTH, HAVE YOU WISHED YOU WERE DEAD OR WISHED YOU COULD GO TO SLEEP AND NOT WAKE UP?: NO
6. HAVE YOU EVER DONE ANYTHING, STARTED TO DO ANYTHING, OR PREPARED TO DO ANYTHING TO END YOUR LIFE?: NO

## 2024-10-11 ASSESSMENT — PAIN DESCRIPTION - ORIENTATION: ORIENTATION: RIGHT;UPPER

## 2024-10-11 ASSESSMENT — PAIN DESCRIPTION - PAIN TYPE: TYPE: ACUTE PAIN

## 2024-10-11 ASSESSMENT — PAIN - FUNCTIONAL ASSESSMENT: PAIN_FUNCTIONAL_ASSESSMENT: 0-10

## 2024-10-11 ASSESSMENT — PAIN DESCRIPTION - LOCATION: LOCATION: ABDOMEN

## 2024-10-12 NOTE — DISCHARGE INSTRUCTIONS
Please follow your primary care doctor in 2 to 3 days.  You may need an ultrasound to look at your gallbladder and liver.  For your constipation, use the MiraLAX and milk of magnesia that you are to have at home.  He may consider use of senna if you would like.  If your pain changes in any way such as worsening and more severe, develop of fevers on vomiting, or if you have any questions or concerns, please return to the emergency department.

## 2024-10-12 NOTE — ED PROVIDER NOTES
"Emergency Department Provider Note        History of Present Illness     History provided by: Patient  Limitations to History: None  External Records Reviewed with Brief Summary: None    HPI:  Shaun Monterroso is a 29 y.o. male healthy male with no prior abdominal surgeries presenting to the emerged part for abdominal pain.  The pain is in his right upper quadrant that he describes as a \"ball.\"  He initially attributed to constipation.  He took some senna and did have a mild increase in his bowel movements but his pain did not change so he came to the emergency department to be evaluated.  He has not had any nausea or vomiting.  He has not had any changes in his abdominal pain.  He has not noticed any pattern to it such as with eating.  He has had heartburn in the past but states that this feels different than his heartburn.  He reports constipation, denies diarrhea, dysuria, chest pain, shortness of breath, fevers or chills.    Physical Exam   Triage vitals:  T 36.3 °C (97.3 °F)  HR 64  BP (!) 135/93  RR 18  O2 97 % None (Room air)    Physical Exam  Vitals and nursing note reviewed.   Constitutional:       General: He is not in acute distress.     Appearance: He is well-developed.   HENT:      Head: Normocephalic and atraumatic.      Right Ear: External ear normal.      Left Ear: External ear normal.      Nose: Nose normal.   Eyes:      General: No scleral icterus.     Conjunctiva/sclera: Conjunctivae normal.      Pupils: Pupils are equal, round, and reactive to light.   Cardiovascular:      Rate and Rhythm: Normal rate and regular rhythm.      Heart sounds: No murmur heard.  Pulmonary:      Effort: Pulmonary effort is normal. No respiratory distress.      Breath sounds: Normal breath sounds.   Abdominal:      Palpations: Abdomen is soft.      Tenderness: There is no abdominal tenderness. There is no guarding or rebound. Negative signs include Tan's sign.   Musculoskeletal:         General: No swelling.      " Cervical back: Neck supple. No rigidity.   Skin:     General: Skin is warm and dry.   Neurological:      General: No focal deficit present.      Mental Status: He is alert.   Psychiatric:         Mood and Affect: Mood normal.          Medical Decision Making & ED Course   Medical Decision Makin y.o. male presenting for right upper quadrant abdominal pain.  He points to his right upper quadrant but has a negative Tan sign and is not reproducible palpation.  He has not noticed any pattern to this pain.  His pain has not changed. He is afebrile hemodynamically stable.  His abdomen is soft, nontender, no peritoneal signs.  CBC and CMP are unremarkable.  Lipase within normal limits.  These findings were discussed with the patient.  We discussed that it may be related to his gallbladder or possibly acid reflux or an ulcer.  We offered him medications, but he declines.  He feels comfortable follow-up with his primary care provider for further evaluation such as an ultrasound  of his liver and gallbladder.  Home care and return instructions discussed. Patient expressed understanding and agreement. Patient discharged in stable condition.    Samuel Oliver DO, PGY-4  Emergency Medicine Resident  ----     Social Determinants of Health which Significantly Impact Care: None identified     EKG Independent Interpretation: EKG not obtained    Independent Result Review and Interpretation: Relevant laboratory and radiographic results were reviewed and independently interpreted by myself.  As necessary, they are commented on in the ED Course.    Chronic conditions affecting the patient's care: As documented above in [facility]    The patient was discussed with the following consultants/services: None    Care Considerations: As documented above in [facility]    ED Course:  Diagnoses as of 10/12/24 0311   Right upper quadrant abdominal pain     Disposition   As a result of the work-up, the patient was discharged home.  he was informed of his  diagnosis and instructed to come back with any concerns or worsening of condition.  he and was agreeable to the plan as discussed above.  he was given the opportunity to ask questions.  All of the patient's questions were answered.    Procedures   Procedures    Patient seen and discussed with ED attending physician.    Samuel Oliver DO  Emergency Medicine     Samuel Oliver DO  Resident  10/12/24 2386

## 2024-11-21 ENCOUNTER — APPOINTMENT (OUTPATIENT)
Dept: PRIMARY CARE | Facility: CLINIC | Age: 29
End: 2024-11-21
Payer: COMMERCIAL

## 2024-11-21 VITALS
TEMPERATURE: 97.9 F | DIASTOLIC BLOOD PRESSURE: 83 MMHG | WEIGHT: 207 LBS | SYSTOLIC BLOOD PRESSURE: 127 MMHG | BODY MASS INDEX: 33.41 KG/M2 | OXYGEN SATURATION: 98 % | HEART RATE: 91 BPM

## 2024-11-21 DIAGNOSIS — R10.9 ABDOMINAL PAIN, UNSPECIFIED ABDOMINAL LOCATION: Primary | ICD-10-CM

## 2024-11-21 DIAGNOSIS — L65.9 HAIR THINNING: ICD-10-CM

## 2024-11-21 PROCEDURE — 99213 OFFICE O/P EST LOW 20 MIN: CPT | Performed by: FAMILY MEDICINE

## 2024-11-21 PROCEDURE — 1036F TOBACCO NON-USER: CPT | Performed by: FAMILY MEDICINE

## 2024-11-21 ASSESSMENT — ENCOUNTER SYMPTOMS
ABDOMINAL PAIN: 1
NAUSEA: 0
CONSTIPATION: 1
VOMITING: 0
FEVER: 0

## 2024-11-21 NOTE — PROGRESS NOTES
Subjective   Patient ID: Shaun Monterroso is a 29 y.o. male who presents for Abdominal Pain.    Abdominal Pain  This is a new problem. The current episode started 1 to 4 weeks ago. The problem has been waxing and waning. The pain is located in the RUQ and RLQ. Associated symptoms include constipation. Pertinent negatives include no fever, nausea or vomiting. Associated symptoms comments: Pt has tried miralax, fiber with no relief .      He is here today to follow-up on visit to ED 10/11 with abdominal pain.   Pain started approximate 1 month ago.  At that time he was having right upper quadrant abdominal pain and went to the ED.  He had labs including a CBC, CMP and lipase-this showed ALT elevated at 85 but was otherwise unremarkable  It was recommended that he follow-up with PCP to discuss a possible ultrasound  The right upper quadrant pain lasted a few days and then improved.  He was not sure if this was due to gas buildup and he was having constipation at that time.  He started taking MiraLAX and fiber.  This seemed to help at first, however he is now having a discomfort involving his entire abdomen.  This is not painful.  He has been having a daily bowel movement.  Has appetite loss but no nausea or vomiting    He would also like a referral to dermatology for hair loss and scalp flaking.  This has been present for several years            Review of Systems   Constitutional:  Negative for fever.   Gastrointestinal:  Positive for abdominal pain and constipation. Negative for nausea and vomiting.       Objective   /83   Pulse 91   Temp 36.6 °C (97.9 °F) (Temporal)   Wt 93.9 kg (207 lb)   SpO2 98%   BMI 33.41 kg/m²     Physical Exam  Vitals reviewed.   Constitutional:       General: He is not in acute distress.     Appearance: Normal appearance. He is well-developed.   HENT:      Head: Normocephalic.   Eyes:      Conjunctiva/sclera: Conjunctivae normal.   Cardiovascular:      Rate and Rhythm: Normal rate  and regular rhythm.      Heart sounds: Normal heart sounds.   Pulmonary:      Effort: Pulmonary effort is normal.      Breath sounds: Normal breath sounds.   Abdominal:      Tenderness: There is abdominal tenderness.      Comments: Mild periumbilical tenderness. Abdomen is soft   Skin:     Findings: No rash.   Neurological:      Mental Status: He is alert.   Psychiatric:         Mood and Affect: Mood normal.         Behavior: Behavior normal.         Assessment/Plan   Assessment & Plan  Abdominal pain, unspecified abdominal location    Orders:    Hepatic Function Panel; Future    US abdomen complete; Future    Hair thinning    Orders:    Referral to Dermatology    He presents today with a 1 month history of abdominal discomfort.  This had initially been located in his right upper quadrant, but now is more of a discomfort involving his entire abdomen.  We we will order an abdominal ultrasound to further evaluate.  We will also recheck LFTs to confirm that ALT has improved to normal range  If ultrasound is negative, then I suspect that this is most likely due to constipation.  Continue medications including MiraLAX and fiber.  Follow-up in 1 month for recheck  We also did give him a referral to dermatology to discuss hair loss and scalp flaking

## 2024-11-25 ENCOUNTER — APPOINTMENT (OUTPATIENT)
Dept: PRIMARY CARE | Facility: CLINIC | Age: 29
End: 2024-11-25
Payer: COMMERCIAL

## 2024-12-06 ENCOUNTER — HOSPITAL ENCOUNTER (OUTPATIENT)
Dept: RADIOLOGY | Facility: CLINIC | Age: 29
Discharge: HOME | End: 2024-12-06
Payer: COMMERCIAL

## 2024-12-06 ENCOUNTER — LAB (OUTPATIENT)
Dept: LAB | Facility: LAB | Age: 29
End: 2024-12-06
Payer: COMMERCIAL

## 2024-12-06 DIAGNOSIS — R10.9 ABDOMINAL PAIN, UNSPECIFIED ABDOMINAL LOCATION: ICD-10-CM

## 2024-12-06 LAB
ALBUMIN SERPL BCP-MCNC: 4.5 G/DL (ref 3.4–5)
ALP SERPL-CCNC: 61 U/L (ref 33–120)
ALT SERPL W P-5'-P-CCNC: 53 U/L (ref 10–52)
AST SERPL W P-5'-P-CCNC: 28 U/L (ref 9–39)
BILIRUB DIRECT SERPL-MCNC: 0.1 MG/DL (ref 0–0.3)
BILIRUB SERPL-MCNC: 0.5 MG/DL (ref 0–1.2)
PROT SERPL-MCNC: 7 G/DL (ref 6.4–8.2)

## 2024-12-06 PROCEDURE — 80076 HEPATIC FUNCTION PANEL: CPT

## 2024-12-06 PROCEDURE — 76700 US EXAM ABDOM COMPLETE: CPT

## 2024-12-06 PROCEDURE — 36415 COLL VENOUS BLD VENIPUNCTURE: CPT

## 2024-12-10 ENCOUNTER — APPOINTMENT (OUTPATIENT)
Dept: PRIMARY CARE | Facility: CLINIC | Age: 29
End: 2024-12-10
Payer: COMMERCIAL

## 2024-12-11 ENCOUNTER — TELEMEDICINE (OUTPATIENT)
Dept: PRIMARY CARE | Facility: CLINIC | Age: 29
End: 2024-12-11
Payer: COMMERCIAL

## 2024-12-11 ENCOUNTER — APPOINTMENT (OUTPATIENT)
Dept: PRIMARY CARE | Facility: CLINIC | Age: 29
End: 2024-12-11
Payer: COMMERCIAL

## 2024-12-11 DIAGNOSIS — K82.4 GALLBLADDER POLYP: Primary | ICD-10-CM

## 2024-12-11 DIAGNOSIS — R16.0 HEPATOMEGALY: ICD-10-CM

## 2024-12-11 DIAGNOSIS — R74.01 ELEVATED ALT MEASUREMENT: ICD-10-CM

## 2024-12-11 DIAGNOSIS — R10.9 ABDOMINAL DISCOMFORT: ICD-10-CM

## 2024-12-11 PROCEDURE — 1036F TOBACCO NON-USER: CPT | Performed by: FAMILY MEDICINE

## 2024-12-11 PROCEDURE — 99213 OFFICE O/P EST LOW 20 MIN: CPT | Performed by: FAMILY MEDICINE

## 2024-12-11 ASSESSMENT — ENCOUNTER SYMPTOMS
FEVER: 0
CONSTIPATION: 0
ABDOMINAL PAIN: 1

## 2024-12-11 NOTE — PROGRESS NOTES
Subjective   Patient ID: Shaun Monterroso is a 29 y.o. male who presents for Abdominal Pain.    Virtual or Telephone Consent    An interactive audio and video telecommunication system which permits real time communications between the patient (at the originating site) and provider (at the distant site) was utilized to provide this telehealth service.   Verbal consent was requested and obtained from Shaun Monterroso on this date, 12/11/24 for a telehealth visit.     Abdominal Pain  The problem has been unchanged. Pertinent negatives include no constipation or fever.      He is here today to follow-up on abdominal discomfort  This has overall been present for approximately 2 months he had initially gone to the ED at onset with pain which was localized to his right upper quadrant.  At that time he had labs including CBC, CMP and lipase.  This showed an ALT elevated at 85 but otherwise unremarkable  I saw him for follow-up on 11/21.  At that time his right upper quadrant pain had improved, but he had then developed a discomfort which involved his entire abdomen.  At that time we ordered an abdominal ultrasound and repeat labs  Abdominal ultrasound on 12/6/2024 showed a gallbladder polyp measuring 5 x 6 x 3 mm, and hepatomegaly.  A 12-month follow-up was recommended for the gallbladder polyp  Repeat LFTs checked 12/6/2024 showed ALT mildly elevated to 53  He has continued to have abdominal discomfort.  This is located in his middle abdomen.  He describes as a mild discomfort which is always present.  It has not gotten worse since last visit.  He has gotten on and off right upper quadrant abdominal pain but does not have any pain in that area currently.  Constipation has gotten better  No alcohol use.  No family history of any liver problems  He had vomited a few weeks ago due to a GI virus, otherwise he has not had any recent nausea      Review of Systems   Constitutional:  Negative for fever.   Gastrointestinal:  Positive for  abdominal pain. Negative for constipation.       Objective   There were no vitals taken for this visit.    Physical Exam  Constitutional:       General: He is not in acute distress.     Appearance: Normal appearance. He is well-developed.   Pulmonary:      Effort: Pulmonary effort is normal.   Skin:     Findings: No rash.   Neurological:      Mental Status: He is alert.   Psychiatric:         Mood and Affect: Mood normal.         Behavior: Behavior normal.         Assessment/Plan   Assessment & Plan  Gallbladder polyp    Orders:    US gallbladder; Future    Abdominal discomfort    Orders:    Referral to Gastroenterology; Future    CT abdomen pelvis w IV contrast; Future    Hepatomegaly    Orders:    Referral to Gastroenterology; Future    CT abdomen pelvis w IV contrast; Future    Elevated ALT measurement    Orders:    Referral to Gastroenterology; Future    CT abdomen pelvis w IV contrast; Future    He has continued to have abdominal discomfort which has now been present for approximately 2 months without improvement.  We reviewed his recent liver ultrasound which showed a gallbladder polyp and hepatomegaly, but was otherwise unremarkable.  We reviewed his most recent labs.  His ALT has improved, but is still mildly elevated at 53.  Looking back, he has had elevated ALT on 3 of his last 4 labs (ALT was also mildly elevated on labs done in January of this year at 54)  His ultrasound does not show a clear cause for his abdominal discomfort.  Since this has continued, we we will order a CT scan to further evaluate and also refer him to gastroenterology.  I recommended that he discuss his abdominal discomfort, as well as finding of hepatomegaly and elevated ALT further with gastroenterology  Follow-up if still having any persistent pain after seeing gastroenterology.  I also recommended that he notify me if any new or worsening gastrointestinal symptoms  We will also order a gallbladder ultrasound to be done in 12  months to follow-up on gallbladder polyp

## 2024-12-23 ENCOUNTER — APPOINTMENT (OUTPATIENT)
Dept: PRIMARY CARE | Facility: CLINIC | Age: 29
End: 2024-12-23
Payer: COMMERCIAL

## 2025-01-27 ENCOUNTER — TELEMEDICINE (OUTPATIENT)
Dept: SLEEP MEDICINE | Facility: CLINIC | Age: 30
End: 2025-01-27
Payer: COMMERCIAL

## 2025-01-27 VITALS
DIASTOLIC BLOOD PRESSURE: 87 MMHG | OXYGEN SATURATION: 96 % | HEIGHT: 66 IN | BODY MASS INDEX: 33.91 KG/M2 | SYSTOLIC BLOOD PRESSURE: 135 MMHG | WEIGHT: 211 LBS | HEART RATE: 84 BPM

## 2025-01-27 DIAGNOSIS — G47.33 OSA ON CPAP: Primary | ICD-10-CM

## 2025-01-27 DIAGNOSIS — G47.33 OSA (OBSTRUCTIVE SLEEP APNEA): ICD-10-CM

## 2025-01-27 ASSESSMENT — ENCOUNTER SYMPTOMS
OCCASIONAL FEELINGS OF UNSTEADINESS: 0
DEPRESSION: 0
LOSS OF SENSATION IN FEET: 0

## 2025-01-27 ASSESSMENT — PAIN SCALES - GENERAL: PAINLEVEL_OUTOF10: 0-NO PAIN

## 2025-01-27 NOTE — PROGRESS NOTES
Patient: Shaun Monterroso   Patient info: [id number]  : 1995 -- AGE 29 y.o.    Clinician: aYmilex Bruno MD   Location [facility]   Service Date: 2025          [facility]  Follow-up Visit Note    TODAY  Originally in person, but we got behind and he needed to go  So offered virtual  When we connected we could only do telephone for now. So I will find another time to continue our conversation more soon. This is a telephone only visit. (With some in person assessments below)     HISTORY OF PRESENT ILLNESS     Shaun Monterroso is a 29 y.o. male who presents to a [facility] for followup of DELFINA     PAST SLEEP HISTORY   Last seen: 2024  Summary of last visit:   ASSESSMENT/PLAN   Mr. Monterroso is a 29 y.o. male  returning to the  Sleep Medicine Clinic for follow-up of DELFINA and daytime fatigue/sleepiness, now with repeat testing which showed moderate DELFINA. Prior failed PAP Therapy, and intolerant. Would like to pursue PAP again,      Problem List and Plan/Orders  Problem List Items Addressed This Visit         DELFINA on CPAP - Primary     Overview       Symptomatic with daytime sleepiness and fatigue.   DATA:HSAT ? Requested those records; 2022 PAP titration study- required more air, but then could not tolerate PAP, AHI on PAP was 2.4 PLM 0. Study was ended early due to intolerance  Had Respironics recalled machine via [facility], new that but did not replace it.   Investigated OAT--> would rather do PAP therapy  Seen by ENT--> Dr. Holt  2024 repeat PSG-moderate DELFINA  split night  planned, but only diagnostic done; Pre-sleep trial with PAP was successful  DME: [facility], AutoPAP ordered with mask from sleep study         Other Visit Diagnoses         DELFINA (obstructive sleep apnea)         Relevant Orders     Follow Up In Adult Sleep Medicine             CGI:  4- no change        Recommendations/Plan/Management:  Initaite PAP, awaiting insurance info with DME--> encouraged to call with insurance information once he has it.   He will continue using meds at night (using herbs, Mg, aptogens)--> he feels this helps his anxiety  Adequate sleep duration and appropriate timing  will see if total sleep time is reduced.     Referral(s): Has been seen by Dr. Holt;  If lingering issues with PAP therapy, will refer to Sleep surgery referral with Dr. Mona Montano or Dr. Ponce.      Followup: 3-4 months, provided contacts for interim care if needed.     Yamilex Bruno MD        INTERIM DATA REVIEW:  Personally reviewed any raw data such as interpretation report, data sheet, hypnogram, and titration table if available and applicable  Notes and encounters in interim    CURRENT HISTORY/CONCERNS    On today's visit, the patient reports   -HARD TO WAKE UP  Still an issue for him despite PAP therapy     Sleep-Wake Schedule:   Bedtime:/sleep latency/Wake time: 12am, asleep soon then awake by 10am  Sleeps 8+ hours  Same on weekends.      SCALES:   ESS: 7 /24 (online) 3 on paper today   DIOMEDES 8 /28  FOSQ 33 /40    DELFINA on PAP Therapy:  Energy is better! So he has seen some improvements, just still hard to wake up      PAP Adherence DATA:   Compliance Report  Usage 12/25/2024 - 01/23/2025  Usage days 30/30 days (100%)  >= 4 hours 26 days (87%)  < 4 hours 4 days (13%)  Usage hours 222 hours 52 minutes  Average usage (total days) 7 hours 26 minutes  Average usage (days used) 7 hours 26 minutes  Median usage (days used) 7 hours 56 minutes  Total used hours (value since last reset - 01/23/2025) 334 hours  AirSense 11 AutoSet  Serial number [id number]  Mode CPAP  Set pressure 8 cmH2O  EPR Fulltime  EPR level 3  Therapy  Leaks - L/min Median: 0.0 95th percentile: 3.4 Maximum: 30.2  Events per hour AI: 3.7 HI: 0.4 AHI: 4.1  Apnea Index Central: 2.3 Obstructive: 1.2 Unknown: 0.2  RERA Index 0.0  Cheyne-Blue respiration  "(average duration per night) 0 minutes (0%)  Usage - hours       REVIEW OF SYSTEMS   Review of Systems    ALLERGIES AND MEDICATIONS   No Known Allergies    MEDICATIONS:     Current Outpatient Medications:     fluticasone (Flonase) 50 mcg/actuation nasal spray, Administer 1 spray into each nostril once daily., Disp: , Rfl:       HISTORIES   PAST MEDICAL HISTORY : He  has no past medical history on file.  Patient Active Problem List   Diagnosis    Attention deficit hyperactivity disorder (ADHD), combined type    Dandruff    Flat foot    DELFINA on CPAP    Rash    Palpitation    Gastroesophageal reflux disease without esophagitis    TRINIDAD (generalized anxiety disorder)    Excessive daytime sleepiness    Compression fracture of fourth lumbar vertebra (Multi)    Chronic throat clearing     PAST SURGICAL HISTORY: He  has a past surgical history that includes Other surgical history (10/10/2022).   FAMILY HISTORY: No changes since previous visit. Otherwise non-contributory as charted.     SOCIAL HISTORY  Patient:  reports that he has never smoked. He has never used smokeless tobacco. He reports that he does not drink alcohol and does not use drugs.     PHYSICAL EXAM     VITAL SIGNS: /87 (BP Location: Left arm, Patient Position: Sitting, BP Cuff Size: Adult long)   Pulse 84   Ht 1.676 m (5' 6\")   Wt 95.7 kg (211 lb)   SpO2 96%   BMI 34.06 kg/m²   PREVIOUS WEIGHTS:  Wt Readings from Last 3 Encounters:   11/21/24 93.9 kg (207 lb)   10/11/24 89.8 kg (198 lb)   09/23/24 91.6 kg (201 lb 14.4 oz)         OTHER RESULTS/DATA     Lab Review  Last iron studies:   Ferritin (ng/mL)   Date Value   01/11/2024 301 (H)   :    CBC:   Lab Results   Component Value Date    WBC 8.2 10/11/2024    HGB 16.0 10/11/2024    HCT 48.3 10/11/2024    MCV 87 10/11/2024     10/11/2024    TSH:   Lab Results   Component Value Date    TSH 1.25 01/11/2024   ; BMP:   Lab Results   Component Value Date    GLUCOSE 85 10/11/2024    CALCIUM 9.3 " 10/11/2024     10/11/2024    K 4.3 10/11/2024    CO2 30 10/11/2024     10/11/2024    BUN 20 10/11/2024    CREATININE 0.99 10/11/2024            ASSESSMENT/PLAN   Mr. Monterroso is a 29 y.o. male  returning to the  Sleep Medicine Clinic for follow-up of moderate DELFINA, on PAP therapy doing well according to compliance data download. Some AHI of about 4.  Unclear if CPAP alone (I ordered AutoPAP) so I reached out to DME  --> after my conversation with him.  Turns out the machine has been changed about 15x (from Auto to CPAP and changed multiple times)    Problem List and Plan/Orders  Problem List Items Addressed This Visit       DELFINA on CPAP - Primary    Overview     Symptomatic with daytime sleepiness and fatigue.   DATA:HSAT 2020? Requested those records; 11/2022 PAP titration study- required more air, but then could not tolerate PAP, AHI on PAP was 2.4 PLM 0. Study was ended early due to intolerance  9/6/2024 repeat PSG-moderate DELFINA split night  planned, but only diagnostic done; Pre-sleep trial with PAP was successful    Had Respironics recalled machine via [facility], new that but did not replace it.   Seen by ENT--> Dr. Holt; Investigated OAT--> would rather do PAP therapy  DME: [facility], AutoPAP ordered with mask from sleep study              CGI:  2-much improved     Recommendations/Plan/Management:  Continue CPAP, currently on 8cmH20 with EPR 3  --> mycharted that I would like to trial with the 9cmH20 and stay there for at least 2 weeks to see if energy in am improves.   DME: [facility]  Future: may change back to AutoPAP  Full discussion regarding morning sleep inertia next visit.      Followup: will find another time to do a visit after this change- soon will add on.     Yamilex Bruno MD

## 2025-01-28 ENCOUNTER — HOSPITAL ENCOUNTER (OUTPATIENT)
Dept: RADIOLOGY | Facility: CLINIC | Age: 30
Discharge: HOME | End: 2025-01-28
Payer: COMMERCIAL

## 2025-01-28 DIAGNOSIS — R74.01 ELEVATED ALT MEASUREMENT: ICD-10-CM

## 2025-01-28 DIAGNOSIS — R10.9 ABDOMINAL DISCOMFORT: ICD-10-CM

## 2025-01-28 DIAGNOSIS — R16.0 HEPATOMEGALY: ICD-10-CM

## 2025-01-28 PROCEDURE — 74177 CT ABD & PELVIS W/CONTRAST: CPT | Performed by: RADIOLOGY

## 2025-01-28 PROCEDURE — 74177 CT ABD & PELVIS W/CONTRAST: CPT

## 2025-01-28 PROCEDURE — 2550000001 HC RX 255 CONTRASTS: Performed by: FAMILY MEDICINE

## 2025-01-28 RX ADMIN — IOHEXOL 75 ML: 350 INJECTION, SOLUTION INTRAVENOUS at 10:01

## 2025-01-31 ENCOUNTER — TELEPHONE (OUTPATIENT)
Dept: PRIMARY CARE | Facility: CLINIC | Age: 30
End: 2025-01-31
Payer: COMMERCIAL

## 2025-02-10 ENCOUNTER — OFFICE VISIT (OUTPATIENT)
Dept: SLEEP MEDICINE | Facility: CLINIC | Age: 30
End: 2025-02-10
Payer: COMMERCIAL

## 2025-02-10 DIAGNOSIS — G47.33 OSA ON CPAP: Primary | ICD-10-CM

## 2025-02-10 DIAGNOSIS — F51.12 INSUFFICIENT SLEEP SYNDROME: ICD-10-CM

## 2025-02-10 DIAGNOSIS — G47.19 EXCESSIVE DAYTIME SLEEPINESS: ICD-10-CM

## 2025-02-10 PROCEDURE — 99214 OFFICE O/P EST MOD 30 MIN: CPT | Performed by: INTERNAL MEDICINE

## 2025-02-10 PROCEDURE — 99214 OFFICE O/P EST MOD 30 MIN: CPT | Mod: GT,95 | Performed by: INTERNAL MEDICINE

## 2025-02-10 NOTE — PROGRESS NOTES
Patient: Shaun Monterroso   Patient info: [id number]  : 1995 -- AGE 29 y.o.    Clinician: Yamilex Bruno MD   Location [facility]   Service Date: 2/10/2025          [facility]  Follow-up Visit Note    Virtual or Telephone Consent  An interactive audio and video telecommunication system which permits real time communications between the patient (at the originating site) and provider (at the distant site) was utilized to provide this telehealth service.   Verbal consent was requested and obtained from Shaun Monterroso on this date, 02/10/25 for a telehealth visit.     HISTORY OF PRESENT ILLNESS     Shaun Monterroso is a 29 y.o. male who presents to a [facility] for followup.       PAST SLEEP HISTORY   Last seen: 2024 for face to face visit.     Summary of last visit:    Mr. Monterroso is a 29 y.o. male  returning to the  Sleep Medicine Clinic for follow-up of DELFINA and daytime fatigue/sleepiness, now with repeat testing which showed moderate DELFINA. Prior failed PAP Therapy, and intolerant. Would like to pursue PAP again,      Problem List and Plan/Orders  Problem List Items Addressed This Visit         DELFINA on CPAP - Primary     Overview       Symptomatic with daytime sleepiness and fatigue.   [facility]; 2022 PAP titration study- required more air, but then could not tolerate PAP, AHI on PAP was 2.4 PLM 0. Study was ended early due to intolerance  Had Respironics recalled machine via [facility], new that but did not replace it.   Investigated OAT--> would rather do PAP therapy  Seen by ENT--> Dr. Holt  2024 repeat PSG-moderate DELFINA  split night  planned, but only diagnostic done; Pre-sleep trial with PAP was successful  DME: [facility], AutoPAP ordered with mask from sleep study               Other Visit Diagnoses         DELFINA (obstructive sleep apnea)         Relevant Orders     Follow Up In  Adult Sleep Medicine          CGI:  4- no change       Recommendations/Plan/Management:  Initaite PAP, awaiting insurance info with DME--> encouraged to call with insurance information once he has it.   He will continue using meds at night (using herbs, Mg, aptogens)--> he feels this helps his anxiety  Adequate sleep duration and appropriate timing  will see if total sleep time is reduced.     Referral(s): Has been seen by Dr. Holt;  If lingering issues with PAP therapy, will refer to Sleep surgery referral with Dr. Mona Montano or Dr. Ponce.      Followup: 3-4 months, provided contacts for interim care if needed.     Yamilex Bruno MD        INTERIM DATA REVIEW:  Personally reviewed any raw data such as interpretation report, data sheet, hypnogram, and titration table if available and applicable  CURRENT HISTORY/CONCERNS  SCALES:   ESS: 7 /24    For Obstructive sleep apnea:  Treatment: with PAP Therapy:  this round of PAP therapy   [facility] 12-5-24  On today's visit, the patient reports doing ok with PAP. Initially, reports having trouble getting used to PAP, after 2 weeks, felt committed and want to fix things- got used to it, and now using nightly.     Mask: on PSG, not his favorite anymore  Likes the pillows pillows mask   Cleans mask regularly    Has some nights with taking off. Getting better about putting it back on  Some nights sleeping through the night more      PAP Adherence DATA:   A PAP adherence download was obtained and raw data was visually reviewed personally today in clinic. (see scanned document in EPIC)  vCompliance Report  Usage 01/08/2025 - 02/06/2025  Usage days 29/30 days (97%)  >= 4 hours 26 days (87%)  < 4 hours 3 days (10%)  Usage hours 203 hours 24 minutes  Average usage (total days) 6 hours 47 minutes  Average usage (days used) 7 hours 1 minutes  Median usage (days used) 7 hours 26 minutes  Total used hours (value since last reset - 02/06/2025) 418 hours  AirSense 11 AutoSet  Serial  "number [id number]  Mode CPAP  Set pressure 9 cmH2O  EPR Fulltime  EPR level 3  Therapy  Leaks - L/min Median: 0.1 95th percentile: 3.7 Maximum: 33.6  Events per hour AI: 3.8 HI: 0.3 AHI: 4.1  Apnea Index Central: 2.4 Obstructive: 1.2 Unknown: 0.2  RERA Index 0.0  Cheyne-Blue respiration (average duration per night) 0 minutes (0%)      PAP benefits:   Patient reports benefits from using PAP as follows:     Waking up is easier now, more energy.  \"Night and day\" when using PAP or not using PAP  Still sleeps a longer than he would like  Varies 6-12 hours, variable.  Admits to sleeping later than he would like but job also varies on when he gets called in       OTHER:  Gabapentin-   did not take this  Mentioned to mom, and discouraged because it could effect something, he did not recall it.  Did not take it.     Sleep-Wake Schedule:   Bedtime:/sleep latency/Wake time: tries before 1am, sometimes later than this. Wake time is variable. 2-10pm.  Some days gets called in earlier.  Wakes 10:30am-11am  Weekends: days off 1:30pm can get up.   Awakenings:  mostly sleeping through the night.  Some nights mask is challenging.   Total nocturnal sleep duration: 6-12 hours         REVIEW OF SYSTEMS   Review of Systems    ALLERGIES AND MEDICATIONS   No Known Allergies    MEDICATIONS:     Current Outpatient Medications:     fluticasone (Flonase) 50 mcg/actuation nasal spray, Administer 1 spray into each nostril once daily. (Patient not taking: Reported on 1/27/2025), Disp: , Rfl:       HISTORIES   PAST MEDICAL HISTORY : He  has no past medical history on file.  Patient Active Problem List   Diagnosis    Attention deficit hyperactivity disorder (ADHD), combined type    Dandruff    Flat foot    DELFINA on CPAP    Rash    Palpitation    Gastroesophageal reflux disease without esophagitis    TRINIDAD (generalized anxiety disorder)    Excessive daytime sleepiness    Compression fracture of fourth lumbar vertebra (Multi)    Chronic throat " clearing     PAST SURGICAL HISTORY: He  has a past surgical history that includes Other surgical history (10/10/2022).   FAMILY HISTORY: No changes since previous visit. Otherwise non-contributory as charted.     SOCIAL HISTORY  Patient:  reports that he has never smoked. He uses smokeless tobacco. He reports that he does not drink alcohol and does not use drugs.     PHYSICAL EXAM     VITAL SIGNS: There were no vitals taken for this visit.  PREVIOUS WEIGHTS:  Wt Readings from Last 3 Encounters:   01/27/25 95.7 kg (211 lb)   11/21/24 93.9 kg (207 lb)   10/11/24 89.8 kg (198 lb)     EXAM:  General: Alert, attentive, in NAD  HEENT: Nares patent   Dentition/Jaw: appropriate dentition;    Neck: no visible masses  Heart: no cyanosis.   Lungs: Clear no cough  Extremities: warm, well perfused, no visible edema, normal joints,   Skin: no visible rash on face  Neurologic: face symmetric   Psychiatric: Affect appropriate      OTHER RESULTS/DATA     Lab Review  Last iron studies:   Ferritin (ng/mL)   Date Value   01/11/2024 301 (H)   :    CBC:   Lab Results   Component Value Date    WBC 8.2 10/11/2024    HGB 16.0 10/11/2024    HCT 48.3 10/11/2024    MCV 87 10/11/2024     10/11/2024    TSH:   Lab Results   Component Value Date    TSH 1.25 01/11/2024   ; BMP:   Lab Results   Component Value Date    GLUCOSE 85 10/11/2024    CALCIUM 9.3 10/11/2024     10/11/2024    K 4.3 10/11/2024    CO2 30 10/11/2024     10/11/2024    BUN 20 10/11/2024    CREATININE 0.99 10/11/2024       Cardiac Review:   last ECG: No results found for this or any previous visit (from the past 4464 hours).  Last Echo: No results found for this or any previous visit from the past 1095 days.      ASSESSMENT/PLAN   Mr. Monterroso is a 29 y.o. male  returning to the  Sleep Medicine Clinic for follow-up of DELFINA, on PAP, now successfully using for past 2 months with good compliance and perceived benefits. Residual intermittent fatigue and sleepiness may  be from intermittent changes in scheduling sleep as it relates to work schedule .    Problem List and Plan/Orders  Problem List Items Addressed This Visit       Excessive daytime sleepiness    DELFINA on CPAP - Primary    Overview     Symptomatic with daytime sleepiness and fatigue.   DATA: [facility]? Requested those records; 11/2022 PAP titration study- required more air, but then could not tolerate PAP, AHI on PAP was 2.4 PLM 0. Study was ended early due to intolerance  9/6/2024 repeat PSG-moderate DELFINA split night planned, but only diagnostic done; Pre-sleep trial with PAP was successful    Had Respironics recalled machine via [facility], new that but did not replace it.   Seen by ENT--> Dr. Holt; Investigated OAT--> would rather do PAP therapy    DME: [facility], AutoPAP ordered, but using CPAP on download (changed his pressure to 9cmH20) --> 2/2025 changed to AutoPAP  -  more energy, with usage            CGI:  2-much improved     Recommendations/Plan/Management:  Continue PAP therapy  Change back to AutoPAP [id number] (he is concerned about going to high again)  Adequate sleep duration and appropriate timing  1am-10am consistently may help to get better regularity    Followup: 6 months, provided contacts for interim care if needed.    Yamilex Bruno MD

## 2025-03-10 ENCOUNTER — APPOINTMENT (OUTPATIENT)
Dept: GASTROENTEROLOGY | Facility: CLINIC | Age: 30
End: 2025-03-10
Payer: COMMERCIAL

## 2025-03-10 VITALS
SYSTOLIC BLOOD PRESSURE: 115 MMHG | OXYGEN SATURATION: 96 % | HEART RATE: 65 BPM | HEIGHT: 66 IN | DIASTOLIC BLOOD PRESSURE: 79 MMHG | WEIGHT: 205 LBS | RESPIRATION RATE: 16 BRPM | BODY MASS INDEX: 32.95 KG/M2

## 2025-03-10 DIAGNOSIS — R74.01 ELEVATED ALT MEASUREMENT: ICD-10-CM

## 2025-03-10 DIAGNOSIS — R10.9 ABDOMINAL PAIN, UNSPECIFIED ABDOMINAL LOCATION: Primary | ICD-10-CM

## 2025-03-10 DIAGNOSIS — R10.9 ABDOMINAL DISCOMFORT: ICD-10-CM

## 2025-03-10 DIAGNOSIS — R16.0 HEPATOMEGALY: ICD-10-CM

## 2025-03-10 PROCEDURE — 99204 OFFICE O/P NEW MOD 45 MIN: CPT | Performed by: STUDENT IN AN ORGANIZED HEALTH CARE EDUCATION/TRAINING PROGRAM

## 2025-03-10 PROCEDURE — 3008F BODY MASS INDEX DOCD: CPT | Performed by: STUDENT IN AN ORGANIZED HEALTH CARE EDUCATION/TRAINING PROGRAM

## 2025-03-10 RX ORDER — OMEPRAZOLE 40 MG/1
40 CAPSULE, DELAYED RELEASE ORAL DAILY
Qty: 30 CAPSULE | Refills: 1 | Status: SHIPPED | OUTPATIENT
Start: 2025-03-10 | End: 2026-03-10

## 2025-03-10 NOTE — PROGRESS NOTES
CC: Abdominal pain, bloating, GB polyp, and abnormal LFTs.    History of Present Illness:   Shaun Monterroso is a 29 y.o. male with a PMH of obesity, DELFINA, GB polyp who presents to clinic for abdominal pain, bloating, GB polyp, and abnormal LFTs.  The pain is located in the RUQ (and LLQ on exam). It is intermittent. Occurs both with food and fasting. He takes NSAIDs a couple times per month. Has not tried any medications. + bloating. Stools are soft.  + Incomplete emptying.  1-2 BMs daily.  He felt great after taking a bottle of magnesium citrate.  His ALT has been elevated over the last year.  It is ranged from 53-85. Used to exercise, but no longer. He believes he eats a healthy diet. No pop. + juice daily. No alcohol. No tobacco. Stress levels are high at times[place]      CT A/P with IV contrast 1/2025: Unremarkable.  Abdominal ultrasound 12/2024: Gallbladder polyp (6 mm), hepatomegaly.  Colonoscopy ~ 2021 or 2022: WNL.     Review of Systems  ROS Negative unless otherwise stated above.    Past Medical/Surgical History  No past medical history on file.   Past Surgical History:   Procedure Laterality Date    OTHER SURGICAL HISTORY  10/10/2022    No history of surgery        Social History   reports that he has never smoked. He uses smokeless tobacco. He reports that he does not drink alcohol and does not use drugs.     Family History  family history is not on file.     Allergies  No Known Allergies    Medications  Current Outpatient Medications   Medication Instructions    fluticasone (Flonase) 50 mcg/actuation nasal spray 1 spray, Daily        Objective   Visit Vitals  /79   Pulse 65   Resp 16        General: A&Ox3, NAD.  HEENT: AT/NC.   CV: RRR. No murmur.  Resp: CTA bilaterally. No wheezing, rhonchi or rales.   GI: Soft, ND. TTP in the RUQ/LLQ.  Extrem: No edema. Pulses intact.  Skin: No Jaundice.   Neuro: No focal deficits.   Psych: Normal mood and affect.     Lab Results   Component  Value Date    WBC 8.2 10/11/2024    HGB 16.0 10/11/2024    HCT 48.3 10/11/2024    MCV 87 10/11/2024     10/11/2024       Chemistry    Lab Results   Component Value Date/Time     10/11/2024 2240    K 4.3 10/11/2024 2240     10/11/2024 2240    CO2 30 10/11/2024 2240    BUN 20 10/11/2024 2240    CREATININE 0.99 10/11/2024 2240    Lab Results   Component Value Date/Time    CALCIUM 9.3 10/11/2024 2240    ALKPHOS 61 12/06/2024 1645    AST 28 12/06/2024 1645    ALT 53 (H) 12/06/2024 1645    BILITOT 0.5 12/06/2024 1645             ASSESSMENT/PLAN  Shaun Monterroso is a 29 y.o. male with a PMH of obesity, DELFINA, GB polyp who presents to clinic for RUQ/LLQ abdominal pain, bloating, GB polyp, and abnormal LFTs. + soft stools with incomplete emptying - felt better after using laxatives.     CT A/P with IV contrast 1/2025: Unremarkable.  Abdominal ultrasound 12/2024: Gallbladder polyp (6 mm), hepatomegaly.  Colonoscopy ~ 2021 or 2022: WNL.     -Start omeprazole 40 mg daily, advised on proper timing.  -Switch fiber supplementation to the powder and titrate as needed.  -Start MiraLAX daily and titrate as needed.  -Obtain expanded liver panel.  -Obtain celiac studies.  -Consider fibroscan.   -Dietician referral.  -Discussed downloading my fitness pal and counting calories.  -Repeat GB US for polyp surveillance in 12/2025.   -Avoid pop, juice, dairy, concentrated sugars, carbonated beverages.  -Discussed the importance of stress reduction, exercise, good sleep hygiene.  -Consider EGD pending clinical course.    Video visit in 4 weeks.      Steve Collins DO

## 2025-04-07 ENCOUNTER — APPOINTMENT (OUTPATIENT)
Dept: DERMATOLOGY | Facility: CLINIC | Age: 30
End: 2025-04-07
Payer: COMMERCIAL

## 2025-06-16 ENCOUNTER — PATIENT OUTREACH (OUTPATIENT)
Dept: CARE COORDINATION | Facility: CLINIC | Age: 30
End: 2025-06-16
Payer: COMMERCIAL

## 2025-06-16 NOTE — PROGRESS NOTES
This RD spoke to pt re: nutrition ref from GI doc for hepatomegaly and abnormal liver labs. Spoke to pt, agreed to work with this RD for such. Per pt will get repeat labs prior to our initial appt on 6/27 at 3p

## 2025-06-27 ENCOUNTER — APPOINTMENT (OUTPATIENT)
Dept: CARE COORDINATION | Facility: CLINIC | Age: 30
End: 2025-06-27
Payer: COMMERCIAL

## 2025-06-27 DIAGNOSIS — R74.01 ELEVATED ALT MEASUREMENT: ICD-10-CM

## 2025-06-27 DIAGNOSIS — R16.0 HEPATOMEGALY: ICD-10-CM

## 2025-06-27 NOTE — PROGRESS NOTES
"INITIAL NUTRITION EDUCATION VISIT    Reason for Nutrition Visit:  Pt is a 29 y.o. male being seen Virtual referred for weight management    Past Medical Hx:  Problem List[1]     Current Medications:   Medications Ordered Prior to Encounter[2]    Food & Nutrition Related History:  Dietary Considerations: none  Food Allergies: None  Intolerance: None  Appetite: Good  GI Symptoms : constipation and abdominal pain, bloating and gas- has a BM at least once a day but \"feels like I could go more than once on some days\"   Swallowing Difficulty: No problems with swallowing  Chewing no problems chewing  Food Preparation: Partner/Spouse  Lives with: wife and 4 yr old son   Cooking Skills/Barriers: None reported  Grocery Shopping: Partner/Spouse  No difficulty affording food  Supplements: fiber  daily  Supplements: Multivitamin irregularly  Sleep duration/quality : 7+ hours    24 Diet Recall:  Meal 1: Skips bfast on week days \"timing\"   Meal 2: Can't remember if he ate lunch yesterday but drank water t/o day   Meal 3: 5p Middle Eastern dish of stir leavitt vegetables (2 cups) with 1 florian   Snack: bluberry fig bar btwn dinner and 8p snack   Snack: 8p chicken nuggest x2-3 pieces + BBQ sauce water     Snacks: fruit, fig bar  Beverages: ginger ale on occ if he eats out, lemonade, apple juice   Eating out: 2 meals a week, usually only when he is at work likes the [employer] place at work  Alcohol Intake: none   Self-Identified Challenges: lack of exercise, has gym membership     Physical Activity:   No     Labs:  Lab Results   Component Value Date     10/11/2024    K 4.3 10/11/2024     10/11/2024    CO2 30 10/11/2024    BUN 20 10/11/2024    CREATININE 0.99 10/11/2024    CALCIUM 9.3 10/11/2024    ALBUMIN 4.5 12/06/2024    PROT 7.0 12/06/2024    BILITOT 0.5 12/06/2024    ALKPHOS 61 12/06/2024    ALT 53 (H) 12/06/2024    AST 28 12/06/2024    GLUCOSE 85 10/11/2024     Lab Results   Component Value Date    CHOL 206 (H) 01/11/2024    " "LDLCALC 138 (H) 01/11/2024    TRIG 77 01/11/2024    HDL 53.0 01/11/2024        Comments:     CMP:  wnl  Lipid panel:  elevated LDL, pt is aware. Will send handout on low fat diet. Already taking fiber suppl   Vitamin D:  none per chart   A1C:  none per chart     Anthropometrics:   Ht Readings from Last 1 Encounters:   03/10/25 1.676 m (5' 6\")      BMI Readings from Last 1 Encounters:   03/10/25 33.09 kg/m²      Wt Readings from Last 10 Encounters:   03/10/25 93 kg (205 lb)   01/27/25 95.7 kg (211 lb)   11/21/24 93.9 kg (207 lb)   10/11/24 89.8 kg (198 lb)   09/23/24 91.6 kg (201 lb 14.4 oz)   09/07/24 92.2 kg (203 lb 4.2 oz)   06/10/24 92.2 kg (203 lb 4.8 oz)   02/13/24 93.6 kg (206 lb 6.4 oz)   12/26/23 90.7 kg (200 lb)   02/13/23 89.4 kg (197 lb)      Weight change:  3/10 wt is reflective of CBW per pt. UBW is ~190#. Weight has fluctuated over the past 3 yrs but \"none of it's intentional\"  Significant Weight Change: No    Visit Summary     Spoke to pt who would like help with weight loss. Was referred to RD by GI doc after appt in March- was c/o pain near gallbladder, was rec'd he follow a low fat diet at the time. Was told this could also be d/t fatty liver. Pt has never worked with an RD before.     Reviewed above meal log and typical diet. Does try to follow a relatively low fat diet, avoids dairy. Wife cooks mostly middle eastern dishes at home which according to pt isn't typically low in fat but \"she uses less fat than usual\".     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 This RD feels most of pt's gradual weight gain over the years is likely d/t his lack of exercise and pt agreed. He is still paying $10/mo for membership which is something he wishes to go back to.     Edu pt on energy balanced for weight management and need for kcal deficit to see weight loss. " Per pt he already eats somewhat intuitively, listens to fullness cues and stopped overeating, did this in an attempt to relieve GI symptoms with little effect.    Created below goals based on the above and pt's feedback on what has worked in the past for him. Will follow-up x 3 wks.     Nutrition Diagnosis:  Diagnosis Statement 1:  Diagnosis Status: New  Diagnosis : Food and nutrition related knowledge deficit related to has never worked with RD before as evidenced by pt interview, request for help with healthy wt loss    Diagnosis Statement 2:  Diagnosis Status: New  Diagnosis : Altered nutrition related lab values  related to gallbladder issues as evidenced by need for low fat diet    Nutrition Interventions:  Provided nutrition education on the following topic(s): Decreased Energy, Exercise, and Plate Method using ACONutritionCounseling: Goal Setting and Motivational Interviewing  Referred pt to Coordination of Care: None  Discussed with pt? Yes  Provided handouts on: low fat diet     Nutrition Goals:  Add smoothie for bfast with protein powder   Go to gym 2 days/week   Nutrition Goals : follow plate structure of 1/2 non starchy vegetable, 1/3 protein, rest carbs     Readiness to Change : Good  Level of Understanding : Good  Anticipated Compliant : Good    Follow up Plan  Will follow-up x 3 wk       [1]   Patient Active Problem List  Diagnosis    Attention deficit hyperactivity disorder (ADHD), combined type    Dandruff    Flat foot    DELFINA on CPAP    Rash    Palpitation    Gastroesophageal reflux disease without esophagitis    TRINIDAD (generalized anxiety disorder)    Excessive daytime sleepiness    Compression fracture of fourth lumbar vertebra (Multi)    Chronic throat clearing    Insufficient sleep syndrome   [2]   Current Outpatient Medications on File Prior to Visit   Medication Sig Dispense Refill    omeprazole (PriLOSEC) 40 mg DR capsule Take 1 capsule (40 mg) by mouth once daily. Do not crush or chew. 30  capsule 1     No current facility-administered medications on file prior to visit.

## 2025-07-18 ENCOUNTER — APPOINTMENT (OUTPATIENT)
Dept: CARE COORDINATION | Facility: CLINIC | Age: 30
End: 2025-07-18
Payer: COMMERCIAL

## 2025-07-18 NOTE — PROGRESS NOTES
"Nutrition Follow-up   Dietitian 3 wk follow-up. Pt is being seen Virtual for weight management    Labs  Lab Results   Component Value Date     10/11/2024    K 4.3 10/11/2024     10/11/2024    CO2 30 10/11/2024    BUN 20 10/11/2024    CREATININE 0.99 10/11/2024    CALCIUM 9.3 10/11/2024    ALBUMIN 4.5 12/06/2024    PROT 7.0 12/06/2024    BILITOT 0.5 12/06/2024    ALKPHOS 61 12/06/2024    ALT 53 (H) 12/06/2024    AST 28 12/06/2024    GLUCOSE 85 10/11/2024       Lab Results   Component Value Date    CHOL 206 (H) 01/11/2024    LDLCALC 138 (H) 01/11/2024    TRIG 77 01/11/2024    HDL 53.0 01/11/2024          Comments: No new labs     Anthropometrics  Ht Readings from Last 1 Encounters:   03/10/25 1.676 m (5' 6\")       BMI Readings from Last 1 Encounters:   03/10/25 33.09 kg/m²       Wt Readings from Last 10 Encounters:   03/10/25 93 kg (205 lb)   01/27/25 95.7 kg (211 lb)   11/21/24 93.9 kg (207 lb)   10/11/24 89.8 kg (198 lb)   09/23/24 91.6 kg (201 lb 14.4 oz)   09/07/24 92.2 kg (203 lb 4.2 oz)   06/10/24 92.2 kg (203 lb 4.8 oz)   02/13/24 93.6 kg (206 lb 6.4 oz)   12/26/23 90.7 kg (200 lb)   02/13/23 89.4 kg (197 lb)       Weight change: hasn't weighed self but is feeling better, no new wts      Visit Summary    Add smoothie for bfast with protein powder- is doing this most of the time but is eating bfast more frequently   Go to gym 2 days/week- has been to gym x 1 and has been riding his bike 3-4 times   Nutrition Goals : follow plate structure of 1/2 non starchy vegetable, 1/3 protein, rest carbs- eating more fruits for the fiber but doesn't feel his meals resemble the Myplate just yet    Pt has been doing well with this weight loss goals. Is feeling better and dose think he has lost some weight. The added fruit he has put in his diet has helped with increasing his fiber intake so he is going to the bathroom more regularly.     He has been enjoying riding his bike more since last fuv- has gone for 3 bike " rides since ranging from  minutes. Admits he likes exercising more outside than in the gym but doesn't want to stop going. He has a friend he goes with, they keep each other motivated. They both prefer to go in the AM, he determined 7am is the best time to go. We discussed setting an earlier bed time to help with this.     Re-edu pt on MyPlate structure. Encouraged increased vegetable intake which he is likely falling a little short of and he voiced understanding. Can cont with rest of diet changes     Nutrition Diagnosis:  Diagnosis Statement 1:  Diagnosis Status: Ongoing  Diagnosis : Food and nutrition related knowledge deficit related to has never worked with RD before as evidenced by pt interview, request for help with healthy wt loss       Nutrition Goals    Go to gym in AM, wake up at 7a   2. Cont with current diet plan, increase vegetable intake to better follow MyPlate  3. Cont with bike riding     Follow up Plan  Will follow-up x 1 mo

## 2025-08-02 LAB
A1AT SERPL-MCNC: NORMAL MG/DL
ANA SER QL IF: NORMAL
CERULOPLASMIN SERPL-MCNC: NORMAL MG/DL
FERRITIN SERPL-MCNC: 152 NG/ML (ref 38–380)
HAV IGM SERPL QL IA: NORMAL
HBV CORE IGM SERPL QL IA: NORMAL
HBV SURFACE AB SERPL IA-ACNC: <5 MIU/ML
HBV SURFACE AG SERPL QL IA: NORMAL
HCV AB SERPL QL IA: NORMAL
IGA SERPL-MCNC: NORMAL MG/DL
IGG SERPL-MCNC: NORMAL MG/DL
INR PPP: 1.1
IRON SATN MFR SERPL: 31 % (CALC) (ref 20–48)
IRON SERPL-MCNC: 102 MCG/DL (ref 50–180)
LKM-1 IGG SER IA-ACNC: NORMAL
MITOCHONDRIA AB SER QL IF: NORMAL
PROTHROMBIN TIME: 11.4 SEC (ref 9–11.5)
SMOOTH MUSCLE AB SER QL IF: NORMAL
TIBC SERPL-MCNC: 327 MCG/DL (CALC) (ref 250–425)
TTG IGA SER-ACNC: NORMAL

## 2025-08-07 LAB
A1AT SERPL-MCNC: 143 MG/DL (ref 83–199)
ANA SER QL IF: NEGATIVE
CERULOPLASMIN SERPL-MCNC: 24 MG/DL (ref 14–30)
FERRITIN SERPL-MCNC: 152 NG/ML (ref 38–380)
HAV IGM SERPL QL IA: NORMAL
HBV CORE IGM SERPL QL IA: NORMAL
HBV SURFACE AB SERPL IA-ACNC: <5 MIU/ML
HBV SURFACE AG SERPL QL IA: NORMAL
HCV AB SERPL QL IA: NORMAL
IGA SERPL-MCNC: 118 MG/DL (ref 47–310)
IGG SERPL-MCNC: 1082 MG/DL (ref 600–1640)
INR PPP: 1.1
IRON SATN MFR SERPL: 31 % (CALC) (ref 20–48)
IRON SERPL-MCNC: 102 MCG/DL (ref 50–180)
LKM-1 IGG SER IA-ACNC: <=20 U
MITOCHONDRIA AB SER QL IF: NEGATIVE
PROTHROMBIN TIME: 11.4 SEC (ref 9–11.5)
SMOOTH MUSCLE AB SER QL IF: NEGATIVE
TIBC SERPL-MCNC: 327 MCG/DL (CALC) (ref 250–425)
TTG IGA SER-ACNC: <1 U/ML

## 2025-08-08 ENCOUNTER — PATIENT OUTREACH (OUTPATIENT)
Dept: CARE COORDINATION | Facility: CLINIC | Age: 30
End: 2025-08-08
Payer: COMMERCIAL

## 2025-08-08 NOTE — PROGRESS NOTES
Spoke to pt re: rescheduling fuv with this RD. Pt declined to reschedule at this time but did agree to contact this RD in future when ready. Will close program at this time. Pt has this RD's direct number

## 2025-08-15 ENCOUNTER — APPOINTMENT (OUTPATIENT)
Dept: CARE COORDINATION | Facility: CLINIC | Age: 30
End: 2025-08-15
Payer: COMMERCIAL